# Patient Record
Sex: FEMALE | Race: WHITE | NOT HISPANIC OR LATINO
[De-identification: names, ages, dates, MRNs, and addresses within clinical notes are randomized per-mention and may not be internally consistent; named-entity substitution may affect disease eponyms.]

---

## 2017-06-19 ENCOUNTER — APPOINTMENT (OUTPATIENT)
Dept: CARDIOLOGY | Facility: CLINIC | Age: 82
End: 2017-06-19

## 2017-06-19 VITALS — BODY MASS INDEX: 17.42 KG/M2 | WEIGHT: 102 LBS | HEIGHT: 64 IN

## 2017-06-19 VITALS — HEART RATE: 72 BPM | DIASTOLIC BLOOD PRESSURE: 70 MMHG | RESPIRATION RATE: 18 BRPM | SYSTOLIC BLOOD PRESSURE: 118 MMHG

## 2017-09-12 ENCOUNTER — APPOINTMENT (OUTPATIENT)
Dept: CARDIOLOGY | Facility: CLINIC | Age: 82
End: 2017-09-12

## 2017-09-12 VITALS — RESPIRATION RATE: 18 BRPM | HEART RATE: 96 BPM | SYSTOLIC BLOOD PRESSURE: 118 MMHG | DIASTOLIC BLOOD PRESSURE: 80 MMHG

## 2017-09-12 VITALS — WEIGHT: 106 LBS | BODY MASS INDEX: 18.1 KG/M2 | HEIGHT: 64 IN

## 2018-03-14 ENCOUNTER — APPOINTMENT (OUTPATIENT)
Dept: CARDIOLOGY | Facility: CLINIC | Age: 83
End: 2018-03-14

## 2018-03-14 VITALS — DIASTOLIC BLOOD PRESSURE: 80 MMHG | RESPIRATION RATE: 18 BRPM | HEART RATE: 76 BPM | SYSTOLIC BLOOD PRESSURE: 120 MMHG

## 2018-03-14 VITALS — HEIGHT: 64 IN | BODY MASS INDEX: 17.93 KG/M2 | WEIGHT: 105 LBS

## 2018-10-09 ENCOUNTER — APPOINTMENT (OUTPATIENT)
Dept: CARDIOLOGY | Facility: CLINIC | Age: 83
End: 2018-10-09

## 2018-10-09 VITALS — SYSTOLIC BLOOD PRESSURE: 120 MMHG | DIASTOLIC BLOOD PRESSURE: 80 MMHG | RESPIRATION RATE: 18 BRPM | HEART RATE: 98 BPM

## 2018-10-09 VITALS — BODY MASS INDEX: 17.42 KG/M2 | WEIGHT: 102 LBS | HEIGHT: 64 IN

## 2018-10-09 DIAGNOSIS — I10 ESSENTIAL (PRIMARY) HYPERTENSION: ICD-10-CM

## 2018-10-09 DIAGNOSIS — I34.0 NONRHEUMATIC MITRAL (VALVE) INSUFFICIENCY: ICD-10-CM

## 2018-10-09 DIAGNOSIS — I48.91 UNSPECIFIED ATRIAL FIBRILLATION: ICD-10-CM

## 2019-01-29 ENCOUNTER — INPATIENT (INPATIENT)
Facility: HOSPITAL | Age: 84
LOS: 3 days | Discharge: NURSING, REHAB OR LONG-TERM CARE | End: 2019-02-02
Attending: SURGERY | Admitting: SURGERY
Payer: MEDICARE

## 2019-01-29 VITALS
HEART RATE: 60 BPM | SYSTOLIC BLOOD PRESSURE: 122 MMHG | TEMPERATURE: 95 F | DIASTOLIC BLOOD PRESSURE: 82 MMHG | RESPIRATION RATE: 18 BRPM | OXYGEN SATURATION: 96 %

## 2019-01-29 DIAGNOSIS — S43.006A UNSPECIFIED DISLOCATION OF UNSPECIFIED SHOULDER JOINT, INITIAL ENCOUNTER: Chronic | ICD-10-CM

## 2019-01-29 DIAGNOSIS — S72.009A FRACTURE OF UNSPECIFIED PART OF NECK OF UNSPECIFIED FEMUR, INITIAL ENCOUNTER FOR CLOSED FRACTURE: Chronic | ICD-10-CM

## 2019-01-29 LAB
ALBUMIN SERPL ELPH-MCNC: 4.2 G/DL — SIGNIFICANT CHANGE UP (ref 3.5–5.2)
ALP SERPL-CCNC: 62 U/L — SIGNIFICANT CHANGE UP (ref 30–115)
ALT FLD-CCNC: 17 U/L — SIGNIFICANT CHANGE UP (ref 0–41)
ANION GAP SERPL CALC-SCNC: 25 MMOL/L — HIGH (ref 7–14)
APPEARANCE UR: ABNORMAL
APTT BLD: 30.9 SEC — SIGNIFICANT CHANGE UP (ref 27–39.2)
AST SERPL-CCNC: 40 U/L — SIGNIFICANT CHANGE UP (ref 0–41)
BASE EXCESS BLDA CALC-SCNC: 0.3 MMOL/L — SIGNIFICANT CHANGE UP (ref -2–2)
BASOPHILS # BLD AUTO: 0.03 K/UL — SIGNIFICANT CHANGE UP (ref 0–0.2)
BASOPHILS NFR BLD AUTO: 0.1 % — SIGNIFICANT CHANGE UP (ref 0–1)
BILIRUB SERPL-MCNC: 0.9 MG/DL — SIGNIFICANT CHANGE UP (ref 0.2–1.2)
BILIRUB UR-MCNC: NEGATIVE — SIGNIFICANT CHANGE UP
BLD GP AB SCN SERPL QL: SIGNIFICANT CHANGE UP
BUN SERPL-MCNC: 45 MG/DL — HIGH (ref 10–20)
CALCIUM SERPL-MCNC: 10.2 MG/DL — HIGH (ref 8.5–10.1)
CHLORIDE SERPL-SCNC: 99 MMOL/L — SIGNIFICANT CHANGE UP (ref 98–110)
CK SERPL-CCNC: 102 U/L — SIGNIFICANT CHANGE UP (ref 0–225)
CO2 SERPL-SCNC: 18 MMOL/L — SIGNIFICANT CHANGE UP (ref 17–32)
COLOR SPEC: YELLOW — SIGNIFICANT CHANGE UP
CREAT SERPL-MCNC: 1.5 MG/DL — SIGNIFICANT CHANGE UP (ref 0.7–1.5)
DIFF PNL FLD: ABNORMAL
EOSINOPHIL # BLD AUTO: 0 K/UL — SIGNIFICANT CHANGE UP (ref 0–0.7)
EOSINOPHIL NFR BLD AUTO: 0 % — SIGNIFICANT CHANGE UP (ref 0–8)
GLUCOSE SERPL-MCNC: 202 MG/DL — HIGH (ref 70–99)
GLUCOSE UR QL: NEGATIVE — SIGNIFICANT CHANGE UP
HCO3 BLDA-SCNC: 24 MMOL/L — SIGNIFICANT CHANGE UP (ref 23–27)
HCT VFR BLD CALC: 34.5 % — LOW (ref 37–47)
HGB BLD-MCNC: 11.3 G/DL — LOW (ref 12–16)
IMM GRANULOCYTES NFR BLD AUTO: 1 % — HIGH (ref 0.1–0.3)
INR BLD: 1.19 RATIO — SIGNIFICANT CHANGE UP (ref 0.65–1.3)
KETONES UR-MCNC: NEGATIVE — SIGNIFICANT CHANGE UP
LACTATE SERPL-SCNC: 5.6 MMOL/L — CRITICAL HIGH (ref 0.5–2.2)
LACTATE SERPL-SCNC: 5.7 MMOL/L — CRITICAL HIGH (ref 0.5–2.2)
LEUKOCYTE ESTERASE UR-ACNC: ABNORMAL
LIDOCAIN IGE QN: 20 U/L — SIGNIFICANT CHANGE UP (ref 7–60)
LYMPHOCYTES # BLD AUTO: 0.58 K/UL — LOW (ref 1.2–3.4)
LYMPHOCYTES # BLD AUTO: 2.6 % — LOW (ref 20.5–51.1)
MCHC RBC-ENTMCNC: 32.8 G/DL — SIGNIFICANT CHANGE UP (ref 32–37)
MCHC RBC-ENTMCNC: 32.8 PG — HIGH (ref 27–31)
MCV RBC AUTO: 100 FL — HIGH (ref 81–99)
MONOCYTES # BLD AUTO: 1.45 K/UL — HIGH (ref 0.1–0.6)
MONOCYTES NFR BLD AUTO: 6.6 % — SIGNIFICANT CHANGE UP (ref 1.7–9.3)
NEUTROPHILS # BLD AUTO: 19.81 K/UL — HIGH (ref 1.4–6.5)
NEUTROPHILS NFR BLD AUTO: 89.7 % — HIGH (ref 42.2–75.2)
NITRITE UR-MCNC: NEGATIVE — SIGNIFICANT CHANGE UP
NRBC # BLD: 0 /100 WBCS — SIGNIFICANT CHANGE UP (ref 0–0)
PCO2 BLDA: 33 MMHG — LOW (ref 38–42)
PH BLDA: 7.47 — HIGH (ref 7.38–7.42)
PH UR: 6 — SIGNIFICANT CHANGE UP (ref 5–8)
PLATELET # BLD AUTO: 201 K/UL — SIGNIFICANT CHANGE UP (ref 130–400)
PO2 BLDA: 44 MMHG — LOW (ref 78–95)
POTASSIUM SERPL-MCNC: 5.8 MMOL/L — HIGH (ref 3.5–5)
POTASSIUM SERPL-SCNC: 5.8 MMOL/L — HIGH (ref 3.5–5)
PROT SERPL-MCNC: 7.2 G/DL — SIGNIFICANT CHANGE UP (ref 6–8)
PROT UR-MCNC: 30
PROTHROM AB SERPL-ACNC: 13.7 SEC — HIGH (ref 9.95–12.87)
RBC # BLD: 3.45 M/UL — LOW (ref 4.2–5.4)
RBC # FLD: 13.2 % — SIGNIFICANT CHANGE UP (ref 11.5–14.5)
SAO2 % BLDA: 82 % — LOW (ref 94–98)
SODIUM SERPL-SCNC: 142 MMOL/L — SIGNIFICANT CHANGE UP (ref 135–146)
SP GR SPEC: >=1.03 — SIGNIFICANT CHANGE UP (ref 1.01–1.03)
TROPONIN T SERPL-MCNC: <0.01 NG/ML — SIGNIFICANT CHANGE UP
TYPE + AB SCN PNL BLD: SIGNIFICANT CHANGE UP
UROBILINOGEN FLD QL: 0.2 — SIGNIFICANT CHANGE UP (ref 0.2–0.2)
WBC # BLD: 22.09 K/UL — HIGH (ref 4.8–10.8)
WBC # FLD AUTO: 22.09 K/UL — HIGH (ref 4.8–10.8)

## 2019-01-29 PROCEDURE — 99223 1ST HOSP IP/OBS HIGH 75: CPT

## 2019-01-29 RX ORDER — TAMSULOSIN HYDROCHLORIDE 0.4 MG/1
0.4 CAPSULE ORAL AT BEDTIME
Qty: 0 | Refills: 0 | Status: DISCONTINUED | OUTPATIENT
Start: 2019-01-29 | End: 2019-01-30

## 2019-01-29 RX ORDER — HEPARIN SODIUM 5000 [USP'U]/ML
5000 INJECTION INTRAVENOUS; SUBCUTANEOUS EVERY 8 HOURS
Qty: 0 | Refills: 0 | Status: DISCONTINUED | OUTPATIENT
Start: 2019-01-29 | End: 2019-02-02

## 2019-01-29 RX ORDER — TIMOLOL 0.5 %
1 DROPS OPHTHALMIC (EYE)
Qty: 0 | Refills: 0 | COMMUNITY

## 2019-01-29 RX ORDER — METOPROLOL TARTRATE 50 MG
50 TABLET ORAL DAILY
Qty: 0 | Refills: 0 | Status: DISCONTINUED | OUTPATIENT
Start: 2019-01-29 | End: 2019-01-30

## 2019-01-29 RX ORDER — CHLORHEXIDINE GLUCONATE 213 G/1000ML
1 SOLUTION TOPICAL
Qty: 0 | Refills: 0 | Status: DISCONTINUED | OUTPATIENT
Start: 2019-01-29 | End: 2019-02-02

## 2019-01-29 RX ORDER — METOPROLOL TARTRATE 50 MG
50 TABLET ORAL DAILY
Qty: 0 | Refills: 0 | Status: DISCONTINUED | OUTPATIENT
Start: 2019-01-29 | End: 2019-01-29

## 2019-01-29 RX ORDER — SODIUM CHLORIDE 9 MG/ML
2000 INJECTION, SOLUTION INTRAVENOUS ONCE
Qty: 0 | Refills: 0 | Status: COMPLETED | OUTPATIENT
Start: 2019-01-29 | End: 2019-01-29

## 2019-01-29 RX ORDER — METOPROLOL TARTRATE 50 MG
1 TABLET ORAL
Qty: 0 | Refills: 0 | COMMUNITY

## 2019-01-29 RX ORDER — ASPIRIN/CALCIUM CARB/MAGNESIUM 324 MG
81 TABLET ORAL DAILY
Qty: 0 | Refills: 0 | Status: DISCONTINUED | OUTPATIENT
Start: 2019-01-29 | End: 2019-01-29

## 2019-01-29 RX ORDER — TIMOLOL 0.5 %
1 DROPS OPHTHALMIC (EYE) DAILY
Qty: 0 | Refills: 0 | Status: DISCONTINUED | OUTPATIENT
Start: 2019-01-29 | End: 2019-02-02

## 2019-01-29 RX ORDER — PANTOPRAZOLE SODIUM 20 MG/1
40 TABLET, DELAYED RELEASE ORAL DAILY
Qty: 0 | Refills: 0 | Status: DISCONTINUED | OUTPATIENT
Start: 2019-01-29 | End: 2019-01-30

## 2019-01-29 RX ORDER — SODIUM CHLORIDE 9 MG/ML
1000 INJECTION, SOLUTION INTRAVENOUS
Qty: 0 | Refills: 0 | Status: DISCONTINUED | OUTPATIENT
Start: 2019-01-29 | End: 2019-01-29

## 2019-01-29 RX ORDER — SODIUM CHLORIDE 9 MG/ML
1000 INJECTION INTRAMUSCULAR; INTRAVENOUS; SUBCUTANEOUS
Qty: 0 | Refills: 0 | Status: DISCONTINUED | OUTPATIENT
Start: 2019-01-29 | End: 2019-01-30

## 2019-01-29 RX ADMIN — SODIUM CHLORIDE 1000 MILLILITER(S): 9 INJECTION, SOLUTION INTRAVENOUS at 16:15

## 2019-01-29 NOTE — ED PROVIDER NOTE - OBJECTIVE STATEMENT
89F with pmh of HTN, paroxysmal AFib not on AC, and left hip fracture and left shoulder fracture with pin placement 5 years ago presents with fall prior to arrival. PT is unsure of mechanism and unsure of LOC. Denies CP, SOB, n/v/d, palpitations, or dizziness.

## 2019-01-29 NOTE — CONSULT NOTE ADULT - SUBJECTIVE AND OBJECTIVE BOX
89 yo female s/p fall with multiple hip and pelvic fracture, found to have pelvic hematoma anterior to bladder, ER tried to put thornton but reported hematuria,  called for thornton placement.  Pt seen and examined at bedside with Dr Darling  In sterile technique a 14f urethral catheter was placed at bedside with Dr Manuel. 10 cc was placed in balloon.  CLear urine drained from catheter.  Pt tolerated procedure well.  Thornton strapped to right upper thigh.    PAST MEDICAL & SURGICAL HISTORY:  Tricuspid regurgitation  Mitral regurgitation  HTN (hypertension)  Paroxysmal atrial fibrillation  Dislocation of shoulder region: L shoulder pins/surgery  Hip fracture: L hip replacement    MEDICATIONS  (STANDING):  chlorhexidine 4% Liquid 1 Application(s) Topical <User Schedule>  heparin  Injectable 5000 Unit(s) SubCutaneous every 8 hours  metoprolol succinate ER 50 milliGRAM(s) Oral daily  pantoprazole  Injectable 40 milliGRAM(s) IV Push daily  sodium chloride 0.9%. 1000 milliLiter(s) (125 mL/Hr) IV Continuous <Continuous>  tamsulosin 0.4 milliGRAM(s) Oral at bedtime  timolol 0.5% Solution 1 Drop(s) Both EYES daily    MEDICATIONS  (PRN):    Allergies    No Known Allergies    Intolerances                          11.3   22.09 )-----------( 201      ( 29 Jan 2019 12:41 )             34.5     Urinalysis Basic - ( 29 Jan 2019 21:10 )    Color: Yellow / Appearance: Cloudy / SG: >=1.030 / pH: x  Gluc: x / Ketone: Negative  / Bili: Negative / Urobili: 0.2   Blood: x / Protein: 30 / Nitrite: Negative   Leuk Esterase: Moderate / RBC: 6-10 /HPF / WBC 6-10 /HPF   Sq Epi: x / Non Sq Epi: x / Bacteria: Moderate /HPF    01-29    142  |  99  |  45<H>  ----------------------------<  202<H>  5.8<H>   |  18  |  1.5    Ca    10.2<H>      29 Jan 2019 12:41    TPro  7.2  /  Alb  4.2  /  TBili  0.9  /  DBili  x   /  AST  40  /  ALT  17  /  AlkPhos  62  01-29

## 2019-01-29 NOTE — CONSULT NOTE ADULT - ATTENDING COMMENTS
I personally saw and examined the patient, reviewed the chart and available data. I discussed the situation with the patient and Nursing as well as trauma team resident on call.     Please note, the patient was seen and examined by me on January 29, 2000 19th when I made rounds with the physician assistant. The note was not reviewed, corrected and signed until tonight
Pt seen and examined.  Agree with resident exam and plan.  -WBAT LLE  -NWB in sling LUE  -pain control  -f/u as outpatient with orthopedics at [address] [facility] with Dr. Mcdaniels or Dr. Nguyễn [phone].
Please see the note documenting the consult on the Swartz catheter insertion written on January 30 but done on January 29

## 2019-01-29 NOTE — CONSULT NOTE ADULT - SUBJECTIVE AND OBJECTIVE BOX
HPI: Ms. Churchill is a 89 y/o F who presents with inability to ambulate for 1 day. States does not recall any fall recently but woke up and was unable to ambulate. States his mild pain in left shoulder but no pain in pelvis or legs.    PMH  Tricuspid regurgitation  Mitral regurgitation  HTN (hypertension)  Paroxysmal atrial fibrillation    PSH  S/P L proximal humerus ORIF, L hip CMN (states both were roughly 4 years ago at [facility])    Home Medications:  Metoprolol Succinate ER 50 mg oral tablet, extended release: 1 tab(s) orally once a day (29 Jan 2019 16:03)  timolol hemihydrate 0.5% ophthalmic solution: 1 dose(s) to each affected eye once a day (29 Jan 2019 16:03)    Allergies    No Known Allergies    Intolerances      SH: Lives alone. Ambulates with cane (in right hand)    Vital Signs Last 24 Hrs  T(C): 35.6 (29 Jan 2019 18:30), Max: 36.2 (29 Jan 2019 12:30)  T(F): 96 (29 Jan 2019 18:30), Max: 97.2 (29 Jan 2019 12:30)  HR: 120 (29 Jan 2019 19:00) (60 - 120)  BP: 139/102 (29 Jan 2019 19:00) (118/62 - 139/102)  BP(mean): 128 (29 Jan 2019 19:00) (88 - 128)  RR: 30 (29 Jan 2019 19:00) (18 - 30)  SpO2: 100% (29 Jan 2019 19:00) (96% - 100%)    PE:  NAD  Respirations non labored  Appropriate mood and affect    LUE  Ecchymosis about anterior chest wall  TTP along clavicle - gross motion with palpation  Skin otherwise intact  AIN/PIN/U motor intact  SILT R/U/M  2+ R pulse    LLE  Non ttp throughout extremity (including groin)  Skin intact  SILT T/DP/SP  EHL/FHL/TA/GS motor intact  2+ DP pulse    Imaging: CT demonstrates left superior and inferior pubic rami fractures with small amount of sacral impaction. Incompletely imaged left clavicle on CT/cxr demonstrates a minimally displaced midshaft clavicle fracture.     A/P  89 y/o F with L clavicle fracture and LC1 pelvic injury  -WBAT LLE  -NWB LUE in sling  -Please obtain Left shoulder/clavicle xrays (ordered)  -PT for ambulation  -Analgesia  -No acute orthopedic intervention

## 2019-01-29 NOTE — ED ADULT NURSE REASSESSMENT NOTE - NS ED NURSE REASSESS COMMENT FT1
Unable to insert thornton catheter due to anatomy. Multiple attempts made in ED, pt tolerated attempts, SICU SRIRAM Tompkins notified at #[id number] as well as ICU RN Dwight. Repeat lactate not drawn in ED due to LR bolus still infusing, PA and RN aware. Pt taken to ICU.

## 2019-01-29 NOTE — CONSULT NOTE ADULT - ASSESSMENT
ASSESSMENT/PLAN: 89yFemale      Neurologic:  Pain control: Tylenol     Respiratory:  - IS, RA     Cardiovascular:  - HDS   - Afib restrt bb     Gastrointestinal/Nutrition:  - NPO till cyst    Genitourinary/Renal:    Hematologic:    Infectious Disease:    Endocrine:    Disposition: ASSESSMENT/PLAN: 89yFemale      Neurologic:  Pain control: Tylenol     Respiratory:  - IS, RA     Cardiovascular:  - HDS   - Afib restrt bb     Gastrointestinal/Nutrition:  - NPO till cystogram     Genitourinary/Renal:    Hematologic:    Infectious Disease:  afeb     Endocrine:    Disposition: ASSESSMENT/PLAN: 89yFemale      Neurologic:  Pain control: Tylenol     Respiratory:  Manubrial fx:  - Pain control   - IS/PT    Cardiovascular:  Hx of Afib:  - Restart home metoprolol    - Monitor HD, Maintain MAP >65    Gastrointestinal/Nutrition:  - NPO, will start diet post cystogram     Genitourinary/Renal:  R/o bladder injury  - F/u cystogram  - Will place thornton  - Strict I/Os  - F/u labs and replete    Hematologic:  - H/H stable, monitor transfuse for Hg <7     Infectious Disease:  Leukocytosis:  Reactive vs sepsis?  - Monitor for s/s SIRS  - F/u UA    Endocrine:      Disposition: ASSESSMENT/PLAN: 89yFemale with pmhx as above  s/p fall from standing one day prior to arrival sustaining pubic rami fractures, clavicle fx and possible bladder rupture     Neurologic:  Pain control: Tylenol     Respiratory:  Manubrial fx:  - Pain control   - IS/PT    Cardiovascular:  Hx of Afib:  - Restart home metoprolol  - Hold home ASA  - Monitor HD, Maintain MAP >65  - F/u EKG    R/o syncope:  - F/u echo     Gastrointestinal/Nutrition:  - NPO except meds, will start diet after cystogram     Genitourinary/Renal:  R/o bladder injury  - F/u cystogram  - Will place thornton  - LR @125   - Strict I/Os  - F/u labs and replete    R/o rhabdo:  - F/u CPK     Hematologic:  - H/H stable, trend, transfuse for Hg <7     MSK:   Left inferior, superior pubic rami fx non displaced manubrial fx, left proximal clavicle fx, distal clavicle fx  - Ortho consulted, appreciate recs    Infectious Disease:  Leukocytosis:  Reactive vs sepsis?  - Monitor for s/s SIRS  - F/u repeat lactate (5.6 on admission)  - F/u UA  - Currently no indication for abx    Endocrine:  - No Hx of DM  - FS q6h for 24hr     Disposition: SICU ASSESSMENT/PLAN: 89yFemale with pmhx as above  s/p fall from standing one day prior to arrival sustaining pubic rami fractures, clavicle fx and possible bladder rupture     Neurologic:  Pain control: Tylenol     Respiratory:  Manubrial fx:  - Pain control   - IS/PT    Cardiovascular:  Hx of Afib:  - Restart home metoprolol  - Hold home ASA  - Monitor HD, Maintain MAP >65  - F/u EKG    R/o syncope:  - F/u echo     Gastrointestinal/Nutrition:  - NPO except meds, will start diet after cystogram     Genitourinary/Renal:  R/o bladder injury  - F/u cystogram  - Will place thornton  - LR @125   - Strict I/Os  - F/u labs and replete    MARIANO:  - Unknown baseline  - Likely BRENDA, fluid resuscitate    R/o rhabdo:  - F/u CPK     Hematologic:  - H/H stable, trend, transfuse for Hg <7     MSK:   Left inferior, superior pubic rami fx non displaced manubrial fx, left proximal clavicle fx, distal clavicle fx  - Ortho consulted, appreciate recs    Infectious Disease:  Leukocytosis:  Reactive vs sepsis?  - Monitor for s/s SIRS  - F/u repeat lactate (5.6 on admission)  - F/u UA  - Currently no indication for abx    Endocrine:  - No Hx of DM  - FS q6h for 24hr     Disposition: SICU ASSESSMENT/PLAN: 89yFemale with pmhx as above  s/p fall from standing one day prior to arrival sustaining pubic rami fractures, clavicle fx and possible bladder rupture     Neurologic:  Pain control: Tylenol     Respiratory:  Manubrial fx:  - Pain control   - IS/PT    Cardiovascular:  Hx of Afib:  - Restart home metoprolol  - Hold home ASA  - Monitor HD, Maintain MAP >65  - F/u EKG    R/o syncope:  - F/u echo   - F/u EEG    Gastrointestinal/Nutrition:  - NPO except meds, will start diet after cystogram     Genitourinary/Renal:  R/o bladder injury  - F/u cystogram  - Will place thornton  - LR @125   - Strict I/Os  - F/u labs and replete    MARIANO:  - Unknown baseline  - Likely BRENDA, fluid resuscitate    R/o rhabdo:  - F/u CPK     Hematologic:  - H/H stable, trend, transfuse for Hg <7     MSK:   Left inferior, superior pubic rami fx non displaced manubrial fx, left proximal clavicle fx, distal clavicle fx  - Ortho consulted, appreciate recs    Infectious Disease:  Leukocytosis:  Reactive vs sepsis?  - Monitor for s/s SIRS  - F/u repeat lactate (5.6 on admission)  - F/u UA  - Currently no indication for abx    Endocrine:  - No Hx of DM  - FS q6h for 24hr     Disposition: SICU ASSESSMENT/PLAN: 89yFemale with pmhx as above  s/p fall from standing one day prior to arrival sustaining pubic rami fractures, clavicle fx and possible bladder rupture     Neurologic:  Pain control: Tylenol     Respiratory:  Manubrium fx:  - Pain control   - IS/PT    Cardiovascular:  Hx of Afib:  - Restart home metoprolol  - Hold home ASA  - Monitor HD, Maintain MAP >65  - F/u EKG    R/o syncope:  - F/u echo     Gastrointestinal/Nutrition:  - Regular diet     Genitourinary/Renal:  R/o bladder injury  - F/u cystogram  - Will place thornton  - LR @125   - Strict I/Os  - F/u labs and replete    MARIANO:  - Unknown baseline  - Likely BRENDA, fluid resuscitate    R/o rhabdo:  - F/u CPK     Hematologic:  - H/H stable, trend, transfuse for Hg <7     MSK:   Left inferior, superior pubic rami fx non displaced manubrial fx, left proximal clavicle fx, distal clavicle fx  - Ortho consulted, appreciate recs    Infectious Disease:  Leukocytosis:  Reactive vs sepsis?  - Monitor for s/s SIRS  - F/u repeat lactate (5.6 on admission)  - F/u UA  - Currently no indication for abx    Endocrine:  - No Hx of DM  - FS q6h for 24hr     Disposition: SICU

## 2019-01-29 NOTE — H&P ADULT - NSHPLABSRESULTS_GEN_ALL_CORE
11.3   22.09 )-----------( 201      ( 29 Jan 2019 12:41 )             34.5     01-29    142  |  99  |  45<H>  ----------------------------<  202<H>  5.8<H>   |  18  |  1.5    Ca    10.2<H>      29 Jan 2019 12:41    TPro  7.2  /  Alb  4.2  /  TBili  0.9  /  DBili  x   /  AST  40  /  ALT  17  /  AlkPhos  62  01-29    CARDIAC MARKERS ( 29 Jan 2019 12:41 )  x     / <0.01 ng/mL / 102 U/L / x     / x    PT/INR - ( 29 Jan 2019 12:41 )   PT: 13.70 sec;   INR: 1.19 ratio         PTT - ( 29 Jan 2019 12:41 )  PTT:30.9 sec    < from: Xray Chest 1 View-PORTABLE IMMEDIATE (01.29.19 @ 13:14) >    Impression:      No focal opacity.      < end of copied text >    < from: Xray Pelvis AP only (01.29.19 @ 13:15) >                                                                        FINDINGS/  IMPRESSION:    There is subtle cortical irregularity of the left inferior pubic ramus   compatible with a minimally displaced fracture, much better appreciated   on the contemporaneous CT.    Stable fibroids.    Left femoral hardware. There is no radiographic evidence for   periprosthetic loosening/fracture.    The visualized bowel and soft tissue are unremarkable.     < end of copied text >    < from: CT Head No Cont (01.29.19 @ 13:21) >      IMPRESSION:     1.  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    2.  No acute fractures, mass effect, midline shift or intracranial   hemorrhage.     < end of copied text >    IMPRESSION:     1.  Diffuse osteoporosis and degenerative changes of the cervical spine   C2-3 through C6-7 as described above.    2.  Hyperlordotic curvature of the cervical spine with mild   anterolisthesis of C3 on C4, C4 on C5, C5 on C6 and  C7on T1.    3.  No acute fractures or dislocations.     4.  Status post left shoulder ORIF.     < end of copied text >    < from: CT Abdomen and Pelvis w/ IV Cont (01.29.19 @ 13:34) >    IMPRESSION:   1.  Acute nondisplaced left inferior pubic ramus and left high superior  pubic ramus/anterior acetabular wall and left pubic body fractures;   ill-defined pelvic soft tissue hematoma along the anterior margin of the   urinary bladder.    2.  Nondisplaced manubrial fracture, comminuted left proximal clavicular   fractureand partially imaged distal clavicular fracture.    3.  No evidence of solid organ or vascular injury in the chest, abdomen   or pelvis.     4.  Additional incidental findings as above.    < end of copied text > Labs:  CAPILLARY BLOOD GLUCOSE                          11.3   22.09 )-----------( 201      ( 29 Jan 2019 12:41 )             34.5       Auto Immature Granulocyte %: 1.0 % (01-29-19 @ 12:41)  Auto Neutrophil %: 89.7 % (01-29-19 @ 12:41)    01-29    142  |  99  |  45<H>  ----------------------------<  202<H>  5.8<H>   |  18  |  1.5      Calcium, Total Serum: 10.2 mg/dL (01-29-19 @ 12:41)      LFTs:             7.2  | 0.9  | 40       ------------------[62      ( 29 Jan 2019 12:41 )  4.2  | x    | 17          Lipase:20     Amylase:x         Lactate, Blood: 5.6 mmol/L (01-29-19 @ 12:41)      Coags:     13.70  ----< 1.19    ( 29 Jan 2019 12:41 )     30.9        CARDIAC MARKERS ( 29 Jan 2019 12:41 )  x     / <0.01 ng/mL / 102 U/L / x     / x          < from: Xray Chest 1 View-PORTABLE IMMEDIATE (01.29.19 @ 13:14) >    Impression:      No focal opacity.      < from: Xray Pelvis AP only (01.29.19 @ 13:15) >  There is subtle cortical irregularity of the left inferior pubic ramus   compatible with a minimally displaced fracture, much better appreciated   on the contemporaneous CT.    Stable fibroids.    Left femoral hardware. There is no radiographic evidence for   periprosthetic loosening/fracture.    The visualized bowel and soft tissue are unremarkable.     < from: CT Head No Cont (01.29.19 @ 13:21) >  IMPRESSION:     1.  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    2.  No acute fractures, mass effect, midline shift or intracranial   hemorrhage.   IMPRESSION:     1.  Diffuse osteoporosis and degenerative changes of the cervical spine   C2-3 through C6-7 as described above.    2.  Hyperlordotic curvature of the cervical spine with mild   anterolisthesis of C3 on C4, C4 on C5, C5 on C6 and  C7on T1.    3.  No acute fractures or dislocations.     4.  Status post left shoulder ORIF.   < from: CT Abdomen and Pelvis w/ IV Cont (01.29.19 @ 13:34) >    IMPRESSION:   1.  Acute nondisplaced left inferior pubic ramus and left high superior  pubic ramus/anterior acetabular wall and left pubic body fractures;   ill-defined pelvic soft tissue hematoma along the anterior margin of the   urinary bladder.    2.  Nondisplaced manubrial fracture, comminuted left proximal clavicular   fractureand partially imaged distal clavicular fracture.    3.  No evidence of solid organ or vascular injury in the chest, abdomen   or pelvis.

## 2019-01-29 NOTE — H&P ADULT - ASSESSMENT
89 year old female with PMH significant for Afib, HTN, tricuspid regurg, and mitral regurg presents to the ED s/p unwitnessed fall, ?HT, ?LOC, on aspirin, found to have left superior and inferior pubic rami fractures, manubrium fracture, left clavicular fracture, and hematoma along anterior margin of urinary bladder.    Plan:  -Swartz  -CT Cysto  -SICU consult  -repeat labs: VBG  -Ortho consult  -Syncope work up: Echo, EEG 89 year old female with PMH significant for Afib, HTN, tricuspid regurg, and mitral regurg presents to the ED s/p unwitnessed fall, ?HT, ?LOC, on aspirin, found to have left superior and inferior pubic rami fractures, manubrium fracture, left clavicular fracture, and hematoma along anterior margin of urinary bladder.    Plan:  -Thornton  -CT Cysto  -SICU consult  -repeat labs: VBG  -Ortho consult  -Syncope work up: Echo, EEG    Senior Trauma Resident Note  89yo f s/p fall -ht ?loc ?asa  L clavicular fracture sling, pain control, ortho  manubrial fx - trops neg, f/u ekg   l inf/sup pubic rami fx  with hematoma surrounding bladder, trend h&h, thornton, ua, ct cysto, f/u ortho  wcc 22, lac 5 f/u repeats, fluids   home meds   sicu monitering   Airway intact  Bilateral Breath Sounds  Palpable pulses in 4 ext  GCS 15, PERRL, HUTCHISON  VSS  No Subq emphysema, abdominal tenderness,  or pelvic instability   CXR and negative  FAST neg  Ct findings above  Will Dispo accordingly  Plan as above d/w Dr Ayesha Baker

## 2019-01-29 NOTE — CONSULT NOTE ADULT - ASSESSMENT
I agreed with the need for a Swartz catheter. Number one we needed to see if there was indeed gross hematuria and if so access for a cystogram. As well she is not a candidate for clean intermittent catheterization is just moving her leg causes her severe pain.    Working with the physician’s assistant Jenifer having her place her finger in the Shila to block the introitus both of us wearing sterile equipment and having prepped and draped patient introducing ourselves and expanding what we were going to do we were able to guide a 16 [place] Swartz into the bladder. We got clear urine return 10 mL of water was placed into the balloon and the cath was pulled back to the bladder neck. Was secured to her right leg with a stat lock we make sure that the back was below the level of her body and on the bed and we instructed the nurse to not more than 500 mL come out at a time. Once it stop straining the total volume should be recorded and should be left to straight drainage. I think the risk of bladder rupture here is small but if necessary a CT cystogram could now be done. I would leave the catheter until she is able to move about as right now putting on a bedpan would be extremely painful but I would start Flomax as she is going to be having some spasm because of pain and the Flomax might help relax the bladder neck. I do not see the need for acute urologic intervention if we are needed please call us back.

## 2019-01-29 NOTE — ED PROVIDER NOTE - PHYSICAL EXAMINATION
CONSTITUTIONAL: Well-developed; well-nourished; in no acute distress.   SKIN: warm, dry  HEAD: Normocephalic; atraumatic.  EYES: PERRL, EOMI, no conjunctival erythema  ENT: No nasal discharge; airway clear.  NECK: Supple; non tender.  CARD: S1, S2 normal; no murmurs, gallops, or rubs. Regular rate and rhythm.   RESP: No wheezes, rales or rhonchi.  ABD: soft ntnd  EXT: Normal ROM.  No clubbing, cyanosis or edema. FROM of left and right arms at shoulder, elbow and wrists bilaterally, no point tenderness, sensation and motor function intact bilaterally.  LYMPH: No acute cervical adenopathy.  NEURO: Alert, oriented, grossly unremarkable  PSYCH: Cooperative, appropriate.

## 2019-01-29 NOTE — ED ADULT NURSE NOTE - OBJECTIVE STATEMENT
Pt aox3, in no acute distress, fell from standing today, no LOC, no use of blood thinners, no head injury, pt has bruising and pain to L chest and L shoulder, family at bedside. Pt placed on cardiac monitor and pulse ox, IV line started, labs obtained. Pt aox3, in no acute distress, fell from standing today, no LOC, no use of blood thinners, no head injury, pt has bruising and pain to L chest and L shoulder, pt also has bruising to L hip, family at bedside. Pt denies pain at this time. Pt placed on cardiac monitor and pulse ox, IV line started, labs obtained.

## 2019-01-29 NOTE — H&P ADULT - PMH
HTN (hypertension)    Mitral regurgitation    Paroxysmal atrial fibrillation    Tricuspid regurgitation

## 2019-01-29 NOTE — H&P ADULT - HISTORY OF PRESENT ILLNESS
89 year old female with PMH significant for Afib, HTN, tricuspid regurg, and mitral regurg presents to the ED s/p unwitnessed 89 year old female with PMH significant for Afib, HTN, tricuspid regurg, and mitral regurg presents to the ED s/p unwitnessed fall.  Patient states that she does not remember falling, she states that she was ambulating around her home yesterday evening, this morning she woke up around 5am and went to the bathroom, went to get coffee, and then went back to bed.  However, patient's daughter states that she was not able to get out of bed this morning and the 89 year old female with PMH significant for Afib, HTN, tricuspid regurg, and mitral regurg presents to the ED s/p unwitnessed fall.  Patient states that she does not remember falling, she states that she was ambulating around her home yesterday evening, this morning she woke up around 5am and went to the bathroom, went to get coffee, and then went back to bed.  However, patient's daughter states that she was not able to get out of bed this morning and she then called EMS.  Patient's daughter states that the hematoma that was present across the left side of her chest was not present a few days ago.  Patient denies any falls in the past few days.  Patient complains of pain near her hips when trying to lift her legs, otherwise denies chest pain, shortness of breath, nausea, vomiting, fever, chills, abdominal pain.

## 2019-01-29 NOTE — ED PROVIDER NOTE - ATTENDING CONTRIBUTION TO CARE
89 y/o female with hx of Afib, not anticoagulated due to frequent falls, hx of left hip ORIF, left shoulder fx presents to ED with unwitnessed fall just prior to arrival.  Cannot give mechanism and unknown prodrome.  Not ambulatory after the fall.  No CP/SOB.  No HA.  PE:  agree with above.  A/P:  Fall, r/o fracture, ICH.  Possible trauma evaluation.

## 2019-01-29 NOTE — CONSULT NOTE ADULT - ASSESSMENT
89 yo female s/p fall with multiple hip and pelvic fracture, found to have pelvic hematoma anterior to bladder. s/p placement of thornton catheter at bedside with clear urine    - keep thornton catheter for now, thornton care  - UA/Urine cx  - pradip has low threshold for bladder injury (wall of bladder intact and urine crystal clear), cystogram at discretion of primary team [place] does not think its warranted now  - team to follow in AM

## 2019-01-29 NOTE — CONSULT NOTE ADULT - SUBJECTIVE AND OBJECTIVE BOX
KAMAR OCAMPO  [id number]  89y Female with pmhx/surgical hx as below presents s/p unwitnessed fall from standing on 1/28 (unclear if on home ASA). No head trauma, unclear LOC. On arrival to ED, Pt GCS 15,afebrile, HDS, extensive bruising noted on left chest and back of neck.    Significant labs on arrival consisted of Leukocytosis to 22, venous lactate 5.6 , Cr 1.5     Positive trauma findings:  - Left non displaced inferior pubic rami fx /Lefgt superior pubic rami fx   - ill defined pelvic soft tissue hematoma along anterior margin of bladder  - non displaced manubrial fx   - comminuted left proximal clavicle fx/distal clavicle fx      Indication for ICU admission:  Admit Date:  ICU Date:  OR Date:    No Known Allergies    PAST MEDICAL & SURGICAL HISTORY:  Tricuspid regurgitation  Mitral regurgitation  HTN (hypertension)  Paroxysmal atrial fibrillation  Dislocation of shoulder region: L shoulder pins/surgery  Hip fracture: L hip replacement    Home Medications:  Metoprolol Succinate ER 50 mg oral tablet, extended release: 1 tab(s) orally once a day (29 Jan 2019 16:03)  timolol hemihydrate 0.5% ophthalmic solution: 1 dose(s) to each affected eye once a day (29 Jan 2019 16:03)        24HRS EVENT:              DVT PTX:     GI PTX:    ***Tubes/Lines/Drains  ***  Peripheral IV  Central Venous Line     	Date   Arterial Line		                Date   [] PICC:         	[] Midline		[] Mediport             Urinary Catheter		Indication: Strict I&O    Date Placed:       REVIEW OF SYSTEMS    [ ] A ten-point review of systems was otherwise negative except as noted.  [ ] Due to altered mental status/intubation, subjective information were not able to be obtained from the patient. History was obtained, to the extent possible, from review of the chart and collateral sources of information.    Daily     Daily         CURRENT MEDS:  Neurologic Medications    Respiratory Medications    Cardiovascular Medications    Gastrointestinal Medications    Genitourinary Medications    Hematologic/Oncologic Medications    Antimicrobial/Immunologic Medications    Endocrine/Metabolic Medications    Topical/Other Medications      ICU Vital Signs Last 24 Hrs  T(C): 36.2 (29 Jan 2019 12:30), Max: 36.2 (29 Jan 2019 12:30)  T(F): 97.2 (29 Jan 2019 12:30), Max: 97.2 (29 Jan 2019 12:30)  HR: 101 (29 Jan 2019 13:30) (60 - 112)  BP: 118/76 (29 Jan 2019 13:30) (118/76 - 126/82)  BP(mean): --  ABP: --  ABP(mean): --  RR: 18 (29 Jan 2019 12:30) (18 - 18)  SpO2: 96% (29 Jan 2019 12:30) (96% - 96%)      Adult Advanced Hemodynamics Last 24 Hrs  CVP(mm Hg): --  CVP(cm H2O): --  CO: --  CI: --  PA: --  PA(mean): --  PCWP: --  SVR: --  SVRI: --  PVR: --  PVRI: --          I&O's Summary    I&O's Detail      PHYSICAL EXAM:    General/Neuro  RASS:             GCS:     = E   / V   / M      Deficits:                             alert & oriented x 3, no focal deficits  Pupils:    Lungs:      clear to auscultation, Normal expansion/effort.     Cardiovascular : S1, S2.  Regular rate and rhythm.  Peripheral edema   Cardiac Rhythm: Normal Sinus Rhythm    GI: Abdomen soft, Non-tender, Non-distended.    Gastrostomy / Jejunostomy tube in place.  Nasogastric tube in place.  Colostomy / Ileostomy.    Wound:    Extremities: Extremities warm, pink, well-perfused. Pulses:Rt     Lt    Derm: Good skin turgor, no skin breakdown.      :       Swartz catheter in place.      CXR:     LABS:  CAPILLARY BLOOD GLUCOSE                              11.3   22.09 )-----------( 201      ( 29 Jan 2019 12:41 )             34.5       01-29    142  |  99  |  45<H>  ----------------------------<  202<H>  5.8<H>   |  18  |  1.5    Ca    10.2<H>      29 Jan 2019 12:41    TPro  7.2  /  Alb  4.2  /  TBili  0.9  /  DBili  x   /  AST  40  /  ALT  17  /  AlkPhos  62  01-29      PT/INR - ( 29 Jan 2019 12:41 )   PT: 13.70 sec;   INR: 1.19 ratio         PTT - ( 29 Jan 2019 12:41 )  PTT:30.9 sec  CARDIAC MARKERS ( 29 Jan 2019 12:41 )  x     / <0.01 ng/mL / 102 U/L / x     / x KAMAR OCAMPO  [id number]  89y Female with pmhx/surgical hx as below presents s/p unwitnessed fall from standing on 1/28 (unclear if on home ASA) no a/c . No head trauma, unclear LOC. On arrival to ED, Pt GCS 15,afebrile, HDS, extensive bruising noted on left chest and back of neck.    Significant labs on arrival consisted of Leukocytosis to 22, venous lactate 5.6 , Cr 1.5     Positive trauma findings:  - Left non displaced inferior pubic rami fx /Lefgt superior pubic rami fx   - ill defined pelvic soft tissue hematoma along anterior margin of bladder  - non displaced manubrial fx   - comminuted left proximal clavicle fx/distal clavicle fx      Indication for ICU admission:  Admit Date:  ICU Date:  OR Date:    No Known Allergies    PAST MEDICAL & SURGICAL HISTORY:  Tricuspid regurgitation  Mitral regurgitation  HTN (hypertension)  Paroxysmal atrial fibrillation  Dislocation of shoulder region: L shoulder pins/surgery  Hip fracture: L hip replacement    Home Medications:  Metoprolol Succinate ER 50 mg oral tablet, extended release: 1 tab(s) orally once a day (29 Jan 2019 16:03)  timolol hemihydrate 0.5% ophthalmic solution: 1 dose(s) to each affected eye once a day (29 Jan 2019 16:03)        24HRS EVENT:              DVT PTX:     GI PTX:    ***Tubes/Lines/Drains  ***  Peripheral IV  Central Venous Line     	Date   Arterial Line		                Date   [] PICC:         	[] Midline		[] Mediport             Urinary Catheter		Indication: Strict I&O    Date Placed:       REVIEW OF SYSTEMS    [ ] A ten-point review of systems was otherwise negative except as noted.  [ ] Due to altered mental status/intubation, subjective information were not able to be obtained from the patient. History was obtained, to the extent possible, from review of the chart and collateral sources of information.    Daily     Daily         CURRENT MEDS:  Neurologic Medications    Respiratory Medications    Cardiovascular Medications    Gastrointestinal Medications    Genitourinary Medications    Hematologic/Oncologic Medications    Antimicrobial/Immunologic Medications    Endocrine/Metabolic Medications    Topical/Other Medications      ICU Vital Signs Last 24 Hrs  T(C): 36.2 (29 Jan 2019 12:30), Max: 36.2 (29 Jan 2019 12:30)  T(F): 97.2 (29 Jan 2019 12:30), Max: 97.2 (29 Jan 2019 12:30)  HR: 101 (29 Jan 2019 13:30) (60 - 112)  BP: 118/76 (29 Jan 2019 13:30) (118/76 - 126/82)  BP(mean): --  ABP: --  ABP(mean): --  RR: 18 (29 Jan 2019 12:30) (18 - 18)  SpO2: 96% (29 Jan 2019 12:30) (96% - 96%)      Adult Advanced Hemodynamics Last 24 Hrs  CVP(mm Hg): --  CVP(cm H2O): --  CO: --  CI: --  PA: --  PA(mean): --  PCWP: --  SVR: --  SVRI: --  PVR: --  PVRI: --          I&O's Summary    I&O's Detail      PHYSICAL EXAM:    General/Neuro  RASS:             GCS:     = E   / V   / M      Deficits:                             alert & oriented x 3, no focal deficits  Pupils:    Lungs:      clear to auscultation, Normal expansion/effort.     Cardiovascular : S1, S2.  Regular rate and rhythm.  Peripheral edema   Cardiac Rhythm: Normal Sinus Rhythm    GI: Abdomen soft, Non-tender, Non-distended.    Gastrostomy / Jejunostomy tube in place.  Nasogastric tube in place.  Colostomy / Ileostomy.    Wound:    Extremities: Extremities warm, pink, well-perfused. Pulses:Rt     Lt    Derm: Good skin turgor, no skin breakdown.      :       Swartz catheter in place.      CXR:     LABS:  CAPILLARY BLOOD GLUCOSE                              11.3   22.09 )-----------( 201      ( 29 Jan 2019 12:41 )             34.5       01-29    142  |  99  |  45<H>  ----------------------------<  202<H>  5.8<H>   |  18  |  1.5    Ca    10.2<H>      29 Jan 2019 12:41    TPro  7.2  /  Alb  4.2  /  TBili  0.9  /  DBili  x   /  AST  40  /  ALT  17  /  AlkPhos  62  01-29      PT/INR - ( 29 Jan 2019 12:41 )   PT: 13.70 sec;   INR: 1.19 ratio         PTT - ( 29 Jan 2019 12:41 )  PTT:30.9 sec  CARDIAC MARKERS ( 29 Jan 2019 12:41 )  x     / <0.01 ng/mL / 102 U/L / x     / x KAMAR OCAMPO  [id number]  89y Female with pmhx/surgical hx as below presents s/p unwitnessed fall from standing on 1/28 (unclear if on home ASA) no a/c . No head trauma, unclear LOC. On arrival to ED, Pt GCS 15,afebrile, HDS, extensive bruising noted on left chest and back of neck.    Significant labs on arrival consisted of Leukocytosis to 22, venous lactate 5.6 , Cr 1.5     Positive trauma findings:  - Left non displaced inferior pubic rami fx /Lefgt superior pubic rami fx   - ill defined pelvic soft tissue hematoma along anterior margin of bladder  - non displaced manubrial fx   - comminuted left proximal clavicle fx/distal clavicle fx      Indication for ICU admission:  Admit Date:  ICU Date:  OR Date:    No Known Allergies    PAST MEDICAL & SURGICAL HISTORY:  Tricuspid regurgitation  Mitral regurgitation  HTN (hypertension)  Paroxysmal atrial fibrillation  Dislocation of shoulder region: L shoulder pins/surgery  Hip fracture: L hip replacement    Home Medications:  Metoprolol Succinate ER 50 mg oral tablet, extended release: 1 tab(s) orally once a day (29 Jan 2019 16:03)  timolol hemihydrate 0.5% ophthalmic solution: 1 dose(s) to each affected eye once a day (29 Jan 2019 16:03)        24HRS EVENT:              DVT PTX:     GI PTX:    ***Tubes/Lines/Drains  ***  Peripheral IV  Central Venous Line     	Date   Arterial Line		                Date   [] PICC:         	[] Midline		[] Mediport             Urinary Catheter		Indication: Strict I&O    Date Placed:       REVIEW OF SYSTEMS    [ ] A ten-point review of systems was otherwise negative except as noted.  [ ] Due to altered mental status/intubation, subjective information were not able to be obtained from the patient. History was obtained, to the extent possible, from review of the chart and collateral sources of information.    Daily     Daily         CURRENT MEDS:  Neurologic Medications    Respiratory Medications    Cardiovascular Medications    Gastrointestinal Medications    Genitourinary Medications    Hematologic/Oncologic Medications    Antimicrobial/Immunologic Medications    Endocrine/Metabolic Medications    Topical/Other Medications      ICU Vital Signs Last 24 Hrs  T(C): 36.2 (29 Jan 2019 12:30), Max: 36.2 (29 Jan 2019 12:30)  T(F): 97.2 (29 Jan 2019 12:30), Max: 97.2 (29 Jan 2019 12:30)  HR: 101 (29 Jan 2019 13:30) (60 - 112)  BP: 118/76 (29 Jan 2019 13:30) (118/76 - 126/82)  BP(mean): --  ABP: --  ABP(mean): --  RR: 18 (29 Jan 2019 12:30) (18 - 18)  SpO2: 96% (29 Jan 2019 12:30) (96% - 96%)        I&O's Summary    I&O's Detail      PHYSICAL EXAM:    General/Neuro  RASS:             GCS:     = E   / V   / M      Deficits:                             alert & oriented x 3, no focal deficits  Pupils:    Lungs:      clear to auscultation, Normal expansion/effort.     Cardiovascular : S1, S2.  Regular rate and rhythm.  Peripheral edema   Cardiac Rhythm: Normal Sinus Rhythm    GI: Abdomen soft, Non-tender, Non-distended.    Gastrostomy / Jejunostomy tube in place.  Nasogastric tube in place.  Colostomy / Ileostomy.    Wound:    Extremities: Extremities warm, pink, well-perfused. Pulses:Rt     Lt    Derm: Good skin turgor, no skin breakdown.      :       Swartz catheter in place.      CXR:     LABS:  CAPILLARY BLOOD GLUCOSE                              11.3   22.09 )-----------( 201      ( 29 Jan 2019 12:41 )             34.5       01-29    142  |  99  |  45<H>  ----------------------------<  202<H>  5.8<H>   |  18  |  1.5    Ca    10.2<H>      29 Jan 2019 12:41    TPro  7.2  /  Alb  4.2  /  TBili  0.9  /  DBili  x   /  AST  40  /  ALT  17  /  AlkPhos  62  01-29      PT/INR - ( 29 Jan 2019 12:41 )   PT: 13.70 sec;   INR: 1.19 ratio         PTT - ( 29 Jan 2019 12:41 )  PTT:30.9 sec  CARDIAC MARKERS ( 29 Jan 2019 12:41 )  x     / <0.01 ng/mL / 102 U/L / x     / x KAMAR OCAMPO  [id number]  89y Female with pmhx/surgical hx as below presents s/p unwitnessed fall from standing on 1/28 (unclear if on home ASA) no a/c . No head trauma, unclear LOC. On arrival to ED, Pt GCS 15,afebrile, HDS, extensive bruising noted on left chest and back of neck.    Significant labs on arrival consisted of Leukocytosis to 22, venous lactate 5.6 , Cr 1.5     Positive trauma findings:  - Left non displaced inferior pubic rami fx /Lefgt superior pubic rami fx   - ill defined pelvic soft tissue hematoma along anterior margin of bladder  - non displaced manubrial fx   - comminuted left proximal clavicle fx/distal clavicle fx      Indication for ICU admission:  Admit Date: 1/29  ICU Date: 1/29      No Known Allergies    PAST MEDICAL & SURGICAL HISTORY:  Tricuspid regurgitation  Mitral regurgitation  HTN (hypertension)  Paroxysmal atrial fibrillation  Dislocation of shoulder region: L shoulder pins/surgery  Hip fracture: L hip replacement    Home Medications:  Metoprolol Succinate ER 50 mg oral tablet, extended release: 1 tab(s) orally once a day (29 Jan 2019 16:03)  timolol hemihydrate 0.5% ophthalmic solution: 1 dose(s) to each affected eye once a day (29 Jan 2019 16:03)        24HRS EVENT:              DVT PTX:     GI PTX:    ***Tubes/Lines/Drains  ***  Peripheral IV  Central Venous Line     	Date   Arterial Line		                Date   [] PICC:         	[] Midline		[] Mediport             Urinary Catheter		Indication: Strict I&O    Date Placed:       REVIEW OF SYSTEMS    [ ] A ten-point review of systems was otherwise negative except as noted.  [ ] Due to altered mental status/intubation, subjective information were not able to be obtained from the patient. History was obtained, to the extent possible, from review of the chart and collateral sources of information.    Daily     Daily         CURRENT MEDS:  Neurologic Medications    Respiratory Medications    Cardiovascular Medications    Gastrointestinal Medications    Genitourinary Medications    Hematologic/Oncologic Medications    Antimicrobial/Immunologic Medications    Endocrine/Metabolic Medications    Topical/Other Medications      ICU Vital Signs Last 24 Hrs  T(C): 36.2 (29 Jan 2019 12:30), Max: 36.2 (29 Jan 2019 12:30)  T(F): 97.2 (29 Jan 2019 12:30), Max: 97.2 (29 Jan 2019 12:30)  HR: 101 (29 Jan 2019 13:30) (60 - 112)  BP: 118/76 (29 Jan 2019 13:30) (118/76 - 126/82)  BP(mean): --  ABP: --  ABP(mean): --  RR: 18 (29 Jan 2019 12:30) (18 - 18)  SpO2: 96% (29 Jan 2019 12:30) (96% - 96%)        I&O's Summary    I&O's Detail      PHYSICAL EXAM:    General/Neuro  RASS:             GCS:     = E   / V   / M      Deficits:                             alert & oriented x 3, no focal deficits  Pupils:    Lungs:      clear to auscultation, Normal expansion/effort.     Cardiovascular : S1, S2.  Regular rate and rhythm.  Peripheral edema   Cardiac Rhythm: Normal Sinus Rhythm    GI: Abdomen soft, Non-tender, Non-distended.    Gastrostomy / Jejunostomy tube in place.  Nasogastric tube in place.  Colostomy / Ileostomy.    Wound:    Extremities: Extremities warm, pink, well-perfused. Pulses:Rt     Lt    Derm: Good skin turgor, no skin breakdown.      :       Swartz catheter in place.      CXR:     LABS:  CAPILLARY BLOOD GLUCOSE                              11.3   22.09 )-----------( 201      ( 29 Jan 2019 12:41 )             34.5       01-29    142  |  99  |  45<H>  ----------------------------<  202<H>  5.8<H>   |  18  |  1.5    Ca    10.2<H>      29 Jan 2019 12:41    TPro  7.2  /  Alb  4.2  /  TBili  0.9  /  DBili  x   /  AST  40  /  ALT  17  /  AlkPhos  62  01-29      PT/INR - ( 29 Jan 2019 12:41 )   PT: 13.70 sec;   INR: 1.19 ratio         PTT - ( 29 Jan 2019 12:41 )  PTT:30.9 sec  CARDIAC MARKERS ( 29 Jan 2019 12:41 )  x     / <0.01 ng/mL / 102 U/L / x     / x KAMAR OCAMPO  [id number]  89y Female with pmhx/surgical hx as below presents s/p unwitnessed fall from standing on 1/28 (unclear if on home ASA) no a/c . No head trauma, unclear LOC. On arrival to ED, Pt GCS 15,afebrile, HDS, extensive bruising noted on left chest and back of neck.    Significant labs on arrival consisted of Leukocytosis to 22, venous lactate 5.6 , Cr 1.5     Positive trauma findings:  - Left non displaced inferior pubic rami fx /Left superior pubic rami fx   - Pelvic soft tissue hematoma along anterior margin of bladder  -anterior acetabular wall and left pubic body fractures  - non displaced manubrium  fx   - comminuted left proximal clavicle fx/distal clavicle fx      Indication for ICU admission:  Admit Date: 1/29  ICU Date: 1/29      No Known Allergies    PAST MEDICAL & SURGICAL HISTORY:  Tricuspid regurgitation  Mitral regurgitation  HTN (hypertension)  Paroxysmal atrial fibrillation  Dislocation of shoulder region: L shoulder pins/surgery  Hip fracture: L hip replacement    Home Medications:  Metoprolol Succinate ER 50 mg oral tablet, extended release: 1 tab(s) orally once a day (29 Jan 2019 16:03)  timolol hemihydrate 0.5% ophthalmic solution: 1 dose(s) to each affected eye once a day (29 Jan 2019 16:03)        DVT PTX: Hep SQ    GI PTX: Protonix    ***Tubes/Lines/Drains  ***  Peripheral IV          REVIEW OF SYSTEMS    [ x] A ten-point review of systems was otherwise negative except as noted.  [ ] Due to altered mental status/intubation, subjective information were not able to be obtained from the patient. History was obtained, to the extent possible, from review of the chart and collateral sources of information.      ICU Vital Signs Last 24 Hrs  T(C): 36.2 (29 Jan 2019 12:30), Max: 36.2 (29 Jan 2019 12:30)  T(F): 97.2 (29 Jan 2019 12:30), Max: 97.2 (29 Jan 2019 12:30)  HR: 101 (29 Jan 2019 13:30) (60 - 112)  BP: 118/76 (29 Jan 2019 13:30) (118/76 - 126/82)    RR: 18 (29 Jan 2019 12:30) (18 - 18)  SpO2: 96% (29 Jan 2019 12:30) (96% - 96%)    PHYSICAL EXAM:    General/Neuro  GCS 15, PERRL    Lungs:  CTAB, large left sided ecchymosis on chest     Cardiovascular : S1, S2.  Regular rate and rhythm.    Cardiac Rhythm: Normal Sinus Rhythm    GI: Abdomen soft, Non-tender, Non-distended. +BS    Extremities: Extremities warm, pink, well-perfused    Derm: Good skin turgor, no skin breakdown.      Imaging:  CT Abd/Pelvis:  - nondisplaced left inferior pubic ramus and left high superior  pubic ramus/anterior acetabular wall and left pubic body fractures. ill-defined pelvic soft tissue hematoma along the anterior margin of the   urinary bladder.  Nondisplaced manubrial fracture, comminuted left proximal clavicular fracture and partially imaged distal clavicular fracture.      CXR:       LABS:  CAPILLARY BLOOD GLUCOSE                     11.3   22.09 )-----------( 201      ( 29 Jan 2019 12:41 )             34.5       01-29    142  |  99  |  45<H>  ----------------------------<  202<H>  5.8<H>   |  18  |  1.5    Ca    10.2<H>      29 Jan 2019 12:41    TPro  7.2  /  Alb  4.2  /  TBili  0.9  /  DBili  x   /  AST  40  /  ALT  17  /  AlkPhos  62  01-29      PT/INR - ( 29 Jan 2019 12:41 )   PT: 13.70 sec;   INR: 1.19 ratio         PTT - ( 29 Jan 2019 12:41 )  PTT:30.9 sec  CARDIAC MARKERS ( 29 Jan 2019 12:41 )  x     / <0.01 ng/mL / 102 U/L / x     / x KAMAR OCAMPO  [id number]  89y Female with pmhx/surgical hx as below presents s/p unwitnessed fall from standing on 1/28 (unclear if on home ASA) no a/c . No head trauma, unclear LOC. On arrival to ED, Pt GCS 15,afebrile, HDS, extensive bruising noted on left chest and back of neck.    Significant labs on arrival consisted of Leukocytosis to 22, venous lactate 5.6 , Cr 1.5     Positive trauma workup findings:  - Left non displaced inferior pubic rami fx /Left superior pubic rami fx   - Pelvic soft tissue hematoma along anterior margin of bladder  -anterior acetabular wall and left pubic body fractures  - non displaced manubrium  fx   - comminuted left proximal clavicle fx/distal clavicle fx      Indication for ICU admission:  Admit Date: 1/29  ICU Date: 1/29      No Known Allergies    PAST MEDICAL & SURGICAL HISTORY:  Tricuspid regurgitation  Mitral regurgitation  HTN (hypertension)  Paroxysmal atrial fibrillation  Dislocation of shoulder region: L shoulder pins/surgery  Hip fracture: L hip replacement    Home Medications:  Metoprolol Succinate ER 50 mg oral tablet, extended release: 1 tab(s) orally once a day (29 Jan 2019 16:03)  timolol hemihydrate 0.5% ophthalmic solution: 1 dose(s) to each affected eye once a day (29 Jan 2019 16:03)        DVT PTX: Hep SQ    GI PTX: Protonix    ***Tubes/Lines/Drains  ***  Peripheral IV          REVIEW OF SYSTEMS    [ x] A ten-point review of systems was otherwise negative except as noted.  [ ] Due to altered mental status/intubation, subjective information were not able to be obtained from the patient. History was obtained, to the extent possible, from review of the chart and collateral sources of information.      ICU Vital Signs Last 24 Hrs  T(C): 36.2 (29 Jan 2019 12:30), Max: 36.2 (29 Jan 2019 12:30)  T(F): 97.2 (29 Jan 2019 12:30), Max: 97.2 (29 Jan 2019 12:30)  HR: 101 (29 Jan 2019 13:30) (60 - 112)  BP: 118/76 (29 Jan 2019 13:30) (118/76 - 126/82)    RR: 18 (29 Jan 2019 12:30) (18 - 18)  SpO2: 96% (29 Jan 2019 12:30) (96% - 96%)    PHYSICAL EXAM:    General/Neuro  GCS 15, PERRL    Lungs:  CTAB, large left sided ecchymosis on chest     Cardiovascular : S1, S2.  Regular rate and rhythm.    Cardiac Rhythm: Normal Sinus Rhythm    GI: Abdomen soft, Non-tender, Non-distended. +BS    Extremities: Extremities warm, pink, well-perfused    Derm: Good skin turgor, no skin breakdown.      Imaging:  CT Abd/Pelvis:  - nondisplaced left inferior pubic ramus and left high superior  pubic ramus/anterior acetabular wall and left pubic body fractures. ill-defined pelvic soft tissue hematoma along the anterior margin of the   urinary bladder.  Nondisplaced manubrial fracture, comminuted left proximal clavicular fracture and partially imaged distal clavicular fracture.      CXR: f/u read      LABS:  CAPILLARY BLOOD GLUCOSE                     11.3   22.09 )-----------( 201      ( 29 Jan 2019 12:41 )             34.5       01-29    142  |  99  |  45<H>  ----------------------------<  202<H>  5.8<H>   |  18  |  1.5    Ca    10.2<H>      29 Jan 2019 12:41    TPro  7.2  /  Alb  4.2  /  TBili  0.9  /  DBili  x   /  AST  40  /  ALT  17  /  AlkPhos  62  01-29      PT/INR - ( 29 Jan 2019 12:41 )   PT: 13.70 sec;   INR: 1.19 ratio         PTT - ( 29 Jan 2019 12:41 )  PTT:30.9 sec  CARDIAC MARKERS ( 29 Jan 2019 12:41 )  x     / <0.01 ng/mL / 102 U/L / x     / x KAAMR OCAMPO  [id number]  89y Female with pmhx/surgical hx as below presents s/p unwitnessed fall from standing on 1/28 (unclear if on home ASA) no a/c . No head trauma, unclear LOC. On arrival to ED, Pt GCS 15,afebrile, HDS, extensive bruising noted on left chest and back of neck.    Significant labs on arrival consisted of Leukocytosis to 22, venous lactate 5.6 , Cr 1.5     Positive trauma workup findings:  - Left non displaced inferior pubic rami fx /Left superior pubic rami fx   - Pelvic soft tissue hematoma along anterior margin of bladder  -anterior acetabular wall and left pubic body fractures  - non displaced manubrium  fx   - comminuted left proximal clavicle fx/distal clavicle fx      Indication for ICU admission: Trauma s/p fall multiple fx   Admit Date: 1/29  ICU Date: 1/29      No Known Allergies    PAST MEDICAL & SURGICAL HISTORY:  Tricuspid regurgitation  Mitral regurgitation  HTN (hypertension)  Paroxysmal atrial fibrillation  Dislocation of shoulder region: L shoulder pins/surgery  Hip fracture: L hip replacement    Home Medications:  Metoprolol Succinate ER 50 mg oral tablet, extended release: 1 tab(s) orally once a day (29 Jan 2019 16:03)  timolol hemihydrate 0.5% ophthalmic solution: 1 dose(s) to each affected eye once a day (29 Jan 2019 16:03)        DVT PTX: Hep SQ    GI PTX: Protonix    ***Tubes/Lines/Drains  ***  Peripheral IV          REVIEW OF SYSTEMS    [ x] A ten-point review of systems was otherwise negative except as noted.  [ ] Due to altered mental status/intubation, subjective information were not able to be obtained from the patient. History was obtained, to the extent possible, from review of the chart and collateral sources of information.      ICU Vital Signs Last 24 Hrs  T(C): 36.2 (29 Jan 2019 12:30), Max: 36.2 (29 Jan 2019 12:30)  T(F): 97.2 (29 Jan 2019 12:30), Max: 97.2 (29 Jan 2019 12:30)  HR: 101 (29 Jan 2019 13:30) (60 - 112)  BP: 118/76 (29 Jan 2019 13:30) (118/76 - 126/82)    RR: 18 (29 Jan 2019 12:30) (18 - 18)  SpO2: 96% (29 Jan 2019 12:30) (96% - 96%)    PHYSICAL EXAM:    General/Neuro  GCS 15, PERRL    Lungs:  CTAB, large left sided ecchymosis on chest     Cardiovascular : S1, S2.  Regular rate and rhythm.    Cardiac Rhythm: Normal Sinus Rhythm    GI: Abdomen soft, Non-tender, Non-distended. +BS    Extremities: Extremities warm, pink, well-perfused    Derm: Good skin turgor, no skin breakdown.      Imaging:  CT Abd/Pelvis:  - nondisplaced left inferior pubic ramus and left high superior  pubic ramus/anterior acetabular wall and left pubic body fractures. ill-defined pelvic soft tissue hematoma along the anterior margin of the   urinary bladder.  Nondisplaced manubrial fracture, comminuted left proximal clavicular fracture and partially imaged distal clavicular fracture.      CXR: f/u read      LABS:  CAPILLARY BLOOD GLUCOSE                     11.3   22.09 )-----------( 201      ( 29 Jan 2019 12:41 )             34.5       01-29    142  |  99  |  45<H>  ----------------------------<  202<H>  5.8<H>   |  18  |  1.5    Ca    10.2<H>      29 Jan 2019 12:41    TPro  7.2  /  Alb  4.2  /  TBili  0.9  /  DBili  x   /  AST  40  /  ALT  17  /  AlkPhos  62  01-29      PT/INR - ( 29 Jan 2019 12:41 )   PT: 13.70 sec;   INR: 1.19 ratio         PTT - ( 29 Jan 2019 12:41 )  PTT:30.9 sec  CARDIAC MARKERS ( 29 Jan 2019 12:41 )  x     / <0.01 ng/mL / 102 U/L / x     / x

## 2019-01-29 NOTE — ED ADULT TRIAGE NOTE - CHIEF COMPLAINT QUOTE
fell at home this am no head trauma no anticoag, pt ha bruising to left upper chest and clavicular aREA

## 2019-01-30 LAB
ANION GAP SERPL CALC-SCNC: 16 MMOL/L — HIGH (ref 7–14)
APPEARANCE UR: CLEAR — SIGNIFICANT CHANGE UP
BASOPHILS # BLD AUTO: 0.05 K/UL — SIGNIFICANT CHANGE UP (ref 0–0.2)
BASOPHILS NFR BLD AUTO: 0.4 % — SIGNIFICANT CHANGE UP (ref 0–1)
BILIRUB UR-MCNC: NEGATIVE — SIGNIFICANT CHANGE UP
BUN SERPL-MCNC: 38 MG/DL — HIGH (ref 10–20)
CALCIUM SERPL-MCNC: 8.6 MG/DL — SIGNIFICANT CHANGE UP (ref 8.5–10.1)
CHLORIDE SERPL-SCNC: 105 MMOL/L — SIGNIFICANT CHANGE UP (ref 98–110)
CK SERPL-CCNC: 41 U/L — SIGNIFICANT CHANGE UP (ref 0–225)
CO2 SERPL-SCNC: 22 MMOL/L — SIGNIFICANT CHANGE UP (ref 17–32)
COLOR SPEC: YELLOW — SIGNIFICANT CHANGE UP
CREAT SERPL-MCNC: 1.3 MG/DL — SIGNIFICANT CHANGE UP (ref 0.7–1.5)
CULTURE RESULTS: NO GROWTH — SIGNIFICANT CHANGE UP
DIFF PNL FLD: ABNORMAL
EOSINOPHIL # BLD AUTO: 0.09 K/UL — SIGNIFICANT CHANGE UP (ref 0–0.7)
EOSINOPHIL NFR BLD AUTO: 0.6 % — SIGNIFICANT CHANGE UP (ref 0–8)
GAS PNL BLDA: SIGNIFICANT CHANGE UP
GLUCOSE BLDC GLUCOMTR-MCNC: 95 MG/DL — SIGNIFICANT CHANGE UP (ref 70–99)
GLUCOSE SERPL-MCNC: 103 MG/DL — HIGH (ref 70–99)
GLUCOSE UR QL: NEGATIVE — SIGNIFICANT CHANGE UP
HCT VFR BLD CALC: 27.2 % — LOW (ref 37–47)
HCT VFR BLD CALC: 28.1 % — LOW (ref 37–47)
HGB BLD-MCNC: 9.4 G/DL — LOW (ref 12–16)
HGB BLD-MCNC: 9.6 G/DL — LOW (ref 12–16)
IMM GRANULOCYTES NFR BLD AUTO: 0.6 % — HIGH (ref 0.1–0.3)
KETONES UR-MCNC: NEGATIVE — SIGNIFICANT CHANGE UP
LACTATE SERPL-SCNC: 1.8 MMOL/L — SIGNIFICANT CHANGE UP (ref 0.5–2.2)
LACTATE SERPL-SCNC: 2 MMOL/L — SIGNIFICANT CHANGE UP (ref 0.5–2.2)
LEUKOCYTE ESTERASE UR-ACNC: NEGATIVE — SIGNIFICANT CHANGE UP
LYMPHOCYTES # BLD AUTO: 1.67 K/UL — SIGNIFICANT CHANGE UP (ref 1.2–3.4)
LYMPHOCYTES # BLD AUTO: 11.7 % — LOW (ref 20.5–51.1)
MAGNESIUM SERPL-MCNC: 0.9 MG/DL — LOW (ref 1.8–2.4)
MAGNESIUM SERPL-MCNC: 1.8 MG/DL — SIGNIFICANT CHANGE UP (ref 1.8–2.4)
MCHC RBC-ENTMCNC: 33.1 PG — HIGH (ref 27–31)
MCHC RBC-ENTMCNC: 33.5 PG — HIGH (ref 27–31)
MCHC RBC-ENTMCNC: 34.2 G/DL — SIGNIFICANT CHANGE UP (ref 32–37)
MCHC RBC-ENTMCNC: 34.6 G/DL — SIGNIFICANT CHANGE UP (ref 32–37)
MCV RBC AUTO: 96.8 FL — SIGNIFICANT CHANGE UP (ref 81–99)
MCV RBC AUTO: 96.9 FL — SIGNIFICANT CHANGE UP (ref 81–99)
MONOCYTES # BLD AUTO: 1.4 K/UL — HIGH (ref 0.1–0.6)
MONOCYTES NFR BLD AUTO: 9.8 % — HIGH (ref 1.7–9.3)
NEUTROPHILS # BLD AUTO: 10.98 K/UL — HIGH (ref 1.4–6.5)
NEUTROPHILS NFR BLD AUTO: 76.9 % — HIGH (ref 42.2–75.2)
NITRITE UR-MCNC: NEGATIVE — SIGNIFICANT CHANGE UP
NRBC # BLD: 0 /100 WBCS — SIGNIFICANT CHANGE UP (ref 0–0)
NRBC # BLD: 0 /100 WBCS — SIGNIFICANT CHANGE UP (ref 0–0)
PH UR: 6 — SIGNIFICANT CHANGE UP (ref 5–8)
PHOSPHATE SERPL-MCNC: 1.5 MG/DL — LOW (ref 2.1–4.9)
PHOSPHATE SERPL-MCNC: 2.7 MG/DL — SIGNIFICANT CHANGE UP (ref 2.1–4.9)
PLATELET # BLD AUTO: 131 K/UL — SIGNIFICANT CHANGE UP (ref 130–400)
PLATELET # BLD AUTO: 139 K/UL — SIGNIFICANT CHANGE UP (ref 130–400)
POTASSIUM SERPL-MCNC: 4.5 MMOL/L — SIGNIFICANT CHANGE UP (ref 3.5–5)
POTASSIUM SERPL-SCNC: 4.5 MMOL/L — SIGNIFICANT CHANGE UP (ref 3.5–5)
PROT UR-MCNC: ABNORMAL
RBC # BLD: 2.81 M/UL — LOW (ref 4.2–5.4)
RBC # BLD: 2.9 M/UL — LOW (ref 4.2–5.4)
RBC # FLD: 13.4 % — SIGNIFICANT CHANGE UP (ref 11.5–14.5)
RBC # FLD: 13.6 % — SIGNIFICANT CHANGE UP (ref 11.5–14.5)
RBC CASTS # UR COMP ASSIST: ABNORMAL /HPF
SODIUM SERPL-SCNC: 143 MMOL/L — SIGNIFICANT CHANGE UP (ref 135–146)
SP GR SPEC: >=1.03 — SIGNIFICANT CHANGE UP (ref 1.01–1.03)
SPECIMEN SOURCE: SIGNIFICANT CHANGE UP
UROBILINOGEN FLD QL: 1 (ref 0.2–0.2)
WBC # BLD: 13.12 K/UL — HIGH (ref 4.8–10.8)
WBC # BLD: 14.27 K/UL — HIGH (ref 4.8–10.8)
WBC # FLD AUTO: 13.12 K/UL — HIGH (ref 4.8–10.8)
WBC # FLD AUTO: 14.27 K/UL — HIGH (ref 4.8–10.8)

## 2019-01-30 PROCEDURE — 99222 1ST HOSP IP/OBS MODERATE 55: CPT | Mod: 25

## 2019-01-30 PROCEDURE — 51703 INSERT BLADDER CATH COMPLEX: CPT

## 2019-01-30 PROCEDURE — 99233 SBSQ HOSP IP/OBS HIGH 50: CPT

## 2019-01-30 RX ORDER — PANTOPRAZOLE SODIUM 20 MG/1
40 TABLET, DELAYED RELEASE ORAL DAILY
Qty: 0 | Refills: 0 | Status: DISCONTINUED | OUTPATIENT
Start: 2019-01-30 | End: 2019-02-02

## 2019-01-30 RX ORDER — MAGNESIUM SULFATE 500 MG/ML
2 VIAL (ML) INJECTION ONCE
Qty: 0 | Refills: 0 | Status: COMPLETED | OUTPATIENT
Start: 2019-01-30 | End: 2019-01-30

## 2019-01-30 RX ORDER — METOPROLOL TARTRATE 50 MG
37.5 TABLET ORAL DAILY
Qty: 0 | Refills: 0 | Status: DISCONTINUED | OUTPATIENT
Start: 2019-01-31 | End: 2019-02-02

## 2019-01-30 RX ORDER — SODIUM CHLORIDE 9 MG/ML
1000 INJECTION INTRAMUSCULAR; INTRAVENOUS; SUBCUTANEOUS
Qty: 0 | Refills: 0 | Status: DISCONTINUED | OUTPATIENT
Start: 2019-01-30 | End: 2019-01-31

## 2019-01-30 RX ORDER — ASPIRIN/CALCIUM CARB/MAGNESIUM 324 MG
81 TABLET ORAL DAILY
Qty: 0 | Refills: 0 | Status: DISCONTINUED | OUTPATIENT
Start: 2019-01-30 | End: 2019-02-02

## 2019-01-30 RX ORDER — ACETAMINOPHEN 500 MG
650 TABLET ORAL EVERY 6 HOURS
Qty: 0 | Refills: 0 | Status: DISCONTINUED | OUTPATIENT
Start: 2019-01-30 | End: 2019-02-02

## 2019-01-30 RX ADMIN — PANTOPRAZOLE SODIUM 40 MILLIGRAM(S): 20 TABLET, DELAYED RELEASE ORAL at 11:27

## 2019-01-30 RX ADMIN — Medication 650 MILLIGRAM(S): at 11:27

## 2019-01-30 RX ADMIN — Medication 81 MILLIGRAM(S): at 11:28

## 2019-01-30 RX ADMIN — CHLORHEXIDINE GLUCONATE 1 APPLICATION(S): 213 SOLUTION TOPICAL at 05:39

## 2019-01-30 RX ADMIN — Medication 12.5 GRAM(S): at 11:52

## 2019-01-30 RX ADMIN — HEPARIN SODIUM 5000 UNIT(S): 5000 INJECTION INTRAVENOUS; SUBCUTANEOUS at 21:24

## 2019-01-30 RX ADMIN — SODIUM CHLORIDE 50 MILLILITER(S): 9 INJECTION INTRAMUSCULAR; INTRAVENOUS; SUBCUTANEOUS at 23:01

## 2019-01-30 RX ADMIN — HEPARIN SODIUM 5000 UNIT(S): 5000 INJECTION INTRAVENOUS; SUBCUTANEOUS at 05:39

## 2019-01-30 RX ADMIN — Medication 1 DROP(S): at 11:28

## 2019-01-30 RX ADMIN — HEPARIN SODIUM 5000 UNIT(S): 5000 INJECTION INTRAVENOUS; SUBCUTANEOUS at 22:35

## 2019-01-30 RX ADMIN — TAMSULOSIN HYDROCHLORIDE 0.4 MILLIGRAM(S): 0.4 CAPSULE ORAL at 05:35

## 2019-01-30 RX ADMIN — Medication 50 MILLIGRAM(S): at 05:39

## 2019-01-30 RX ADMIN — HEPARIN SODIUM 5000 UNIT(S): 5000 INJECTION INTRAVENOUS; SUBCUTANEOUS at 13:53

## 2019-01-30 NOTE — PHYSICAL THERAPY INITIAL EVALUATION ADULT - LEVEL OF INDEPENDENCE: GAIT, REHAB EVAL
pt attempted one step but unable to continue 2* pt has difficulty weight shifting/maximum assist (25% patients effort)

## 2019-01-30 NOTE — PROGRESS NOTE ADULT - ASSESSMENT
ASSESSMENT/PLAN: 89yFemale with pmhx as above  s/p fall from standing one day prior to arrival sustaining pubic rami fractures, clavicle fx and possible bladder rupture     Neurologic: alert and oriented, denies any pain  Pain control: Tylenol     Respiratory:  Manubrium fx:  - Pain control   - IS/PT    Cardiovascular:  Hx of Afib:  - Restart home metoprolol  - Hold home ASA  - Monitor HD, Maintain MAP >65  - F/u EKG - afib with rvr and incomplete rbb, possible cardiology consult in am and echo pending    Gastrointestinal/Nutrition:  - Regular diet     Genitourinary/Renal:  R/o bladder injury  - F/u cystogram - pending completion, not needed from urology pov  - Swartz placed by urology, no gross hematuria, CT scan reviewed bedsided with urology who saw no gross signs of bladder injury (also not visualized on initial ct ap)  - LR @125, lactate trending downwards  - Strict I/Os  - F/u labs and replete prn    MARIANO:  - Unknown baseline  - Likely BRENDA, fluid resuscitate    R/o rhabdo:  - F/u CPK     Hematologic:  - H/H stable, trend, transfuse for Hg <7     MSK:   Left inferior, superior pubic rami fx non displaced manubrial fx, left proximal clavicle fx, distal clavicle fx  - Ortho consulted, appreciate recs    Infectious Disease:  Leukocytosis:  Reactive vs sepsis?  - Monitor for s/s SIRS  - Currently no indication for abx    Endocrine:  - No Hx of DM  - FS q6h for 24hr     Disposition: SICU ASSESSMENT/PLAN: 89y F with pmhx as above s/p fall from standing one day prior to arrival sustaining pubic rami fractures, clavicle fx and possible bladder rupture     Neurologic: alert and oriented, denies any pain  Pain control: Tylenol     Respiratory:  Manubrium fx:  - Pain control   - IS/PT    Cardiovascular:  Hx of Afib:  - Restart home metoprolol  - Hold home ASA  - Monitor HD, Maintain MAP >65  - F/u EKG - afib with rvr and incomplete rbb, possible cardiology consult in am and echo pending    Gastrointestinal/Nutrition:  - Regular diet     Genitourinary/Renal:  R/o bladder injury  - F/u cystogram - pending completion, not needed from urology pov  - Swartz placed by urology, no gross hematuria, CT scan reviewed bedsided with urology who saw no gross signs of bladder injury (also not visualized on initial ct ap)  - LR @125, lactate trending downwards  - Strict I/Os  - F/u labs and replete prn    MARIANO:  - Unknown baseline  - Likely BRENDA, fluid resuscitate    R/o rhabdo:  - F/u CPK     Hematologic:  - H/H stable, trend, transfuse for Hg <7     MSK:   Left inferior, superior pubic rami fx non displaced manubrial fx, left proximal clavicle fx, distal clavicle fx  - Ortho consulted, appreciate recs    Infectious Disease:  Leukocytosis:  Reactive vs sepsis?  - Monitor for s/s SIRS  - Currently no indication for abx    Endocrine:  - No Hx of DM  - FS q6h for 24hr     Disposition: SICU ASSESSMENT/PLAN: 89yFemale with pmhx as above  s/p fall from standing one day prior to arrival sustaining pubic rami fractures, clavicle fx and  bladder hematoma     Neurologic: alert and oriented, denies any pain. 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 Continues to deny fall, denies any loss of consciousness   Pain control:  start tylenol ATC for shoulder pain on motion . On home timolol eye drops.     Respiratory:- Chest CT: "Scattered areas of tubular, branching opacities in the right upper lobe and to a lesser degree in the left upper lobe most likely represents areas of mucoid impaction. No lobar consolidations, pleural   effusions or pneumothorax. No suspicious pulmonary masses. Bibasilar dependent atelectasis. Patent central airways;" encourage IS. On room air. Early ambulation with PT when able, uses cane and walker at home.   Manubrium fx: - Pain control as needed- denies any pain, not on any meds.  IS/PT  ABG- 7.49/ 31/58, bicarb 24, 93%     Cardiovascular:  Hx of Afib, Mitral and tricuspid regurg: on home metoprolol for rate control, not on anticoagulation. Significant ecchymosis on left chest and shoulder. Holding home ASA  - Monitor HD, Maintain MAP >65.   -  EKG - afib with rvr and incomplete rbb, echo pending    Gastrointestinal/Nutrition:  - Regular diet, on PPI     Genitourinary/Renal:  - concern for bladder injury- meatal bleeding after attempted thornton placement in ED. Urology placed thornton, no noted gross hematuria and images reviewed with radiology. Per urology, no need for CT cysto at this time, no sign of bladder injury. Urinalysis repeated with no sign of UTI, Urine culture collected and pending. On flomax.  UOP ~50ml/hr.  - Decrease IVF to 50 as taking in oral intake, Cr 1.5-> 1.3, no previous Cr available for MARIANO vs CKD, likely MARIANO. Continue to trend and hydrate gently.    - Lactate maximum 5.7-> 0.9, normalized. Ck 41, no rhabdomyo.   - Strict I/Os  - F/u labs and replete prn. hypomagnesemia, replete.       Hematologic:  -Hemoglobin 11.3-> 9.6-> 9.1, most likely reflecting original hemoconcentration as pan-cell line decrease. No tachycardia. Continue to monitor. Plt 131, INR 1.19. Restart aspirin and start on SQH, monitor ecchymosis.     MSK:   Left inferior, superior pubic rami fx, non displaced manubrial fx, left proximal clavicle fx, distal clavicle fx: pain control with ATC Tylenol and reassess. Motor and sensory intact   - Ortho consulted: follow up recommendations. Follow up reads of left clavicle and shoulder Xray (completed). Has left upper extremity sling. WBAT LLE, and NWB in LUE with sling. PT for ambulation, pain control, nonoperative management.  - has hardware noted on imaging  - follow up social work, case management, PT and physiatry consult    Infectious Disease:  Leukocytosis 13.2 (14.3, 22.1), likely reactive and hemoconcentration on presentation. Currently no indication for abx. Tmax 97.4. Urinalysis 1/30 negative, CT chest with bibasilar atelectasis. Continue to monitor, use IS    Endocrine:  - No Hx of DM, + appetite, tolerated small diet yesterday. FSG 95, no need for fingersticks to continue.       Disposition: consider downgrade with social work, PT and physiatry consult. case management- lives alone, uses cane and walker, with polytrauma

## 2019-01-30 NOTE — CHART NOTE - NSCHARTNOTEFT_GEN_A_CORE
Ms. Churchill is a 89yo F who presented after an unwitnessed likely fall, does not recall event, with bruising and GCS15 noted to have left nondisplaced inferior pubic rami fx/ left superior pubic rami fracture/ anterior acetabular wall and left pubic body fracture, nondisplaced manubrium fracture, comminuted left clavicle  and distal clavicle fracture, and  pelvic soft tissue hematoma along the anterior bladder wall.  She had traumatic attempted thornton placement                   ASSESSMENT/PLAN: 89yFemale with pmhx as above  s/p fall from standing one day prior to arrival sustaining pubic rami fractures, clavicle fx and  bladder hematoma     Neurologic: alert and oriented, denies any pain. 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 Continues to deny fall, denies any loss of consciousness   Pain control:  start tylenol ATC for shoulder pain on motion . On home timolol eye drops.     Respiratory:- Chest CT: "Scattered areas of tubular, branching opacities in the right upper lobe and to a lesser degree in the left upper lobe most likely represents areas of mucoid impaction. No lobar consolidations, pleural   effusions or pneumothorax. No suspicious pulmonary masses. Bibasilar dependent atelectasis. Patent central airways;" encourage IS. On room air. Early ambulation with PT when able, uses cane and walker at home.   Manubrium fx: - Pain control as needed- denies any pain, not on any meds.  IS/PT  ABG- 7.49/ 31/58, bicarb 24, 93%     Cardiovascular:  Hx of Afib, Mitral and tricuspid regurg: on home metoprolol for rate control, not on anticoagulation. Significant ecchymosis on left chest and shoulder. Holding home ASA  - Monitor HD, Maintain MAP >65.   -  EKG - afib with rvr and incomplete rbb, echo pending    Gastrointestinal/Nutrition:  - Regular diet, on PPI     Genitourinary/Renal:  - concern for bladder injury- meatal bleeding after attempted thornton placement in ED. Urology placed thornton, no noted gross hematuria and images reviewed with radiology. Per urology, no need for CT cysto at this time, no sign of bladder injury. Urinalysis repeated with no sign of UTI, Urine culture collected and pending. On flomax.  UOP ~50ml/hr.  - Decrease IVF to 50 as taking in oral intake, Cr 1.5-> 1.3, no previous Cr available for MARIANO vs CKD, likely MARIANO. Continue to trend and hydrate gently.    - Lactate maximum 5.7-> 0.9, normalized. Ck 41, no rhabdomyo.   - Strict I/Os  - F/u labs and replete prn. hypomagnesemia, replete.       Hematologic:  -Hemoglobin 11.3-> 9.6-> 9.1, most likely reflecting original hemoconcentration as pan-cell line decrease. No tachycardia. Continue to monitor. Plt 131, INR 1.19. Restart aspirin and start on SQH, monitor ecchymosis.     MSK:   Left inferior, superior pubic rami fx, non displaced manubrial fx, left proximal clavicle fx, distal clavicle fx: pain control with ATC Tylenol and reassess. Motor and sensory intact   - Ortho consulted: follow up recommendations. Follow up reads of left clavicle and shoulder Xray (completed). Has left upper extremity sling. WBAT LLE, and NWB in LUE with sling. PT for ambulation, pain control, nonoperative management.  - has hardware noted on imaging  - follow up social work, case management, PT and physiatry consult    Infectious Disease:  Leukocytosis 13.2 (14.3, 22.1), likely reactive and hemoconcentration on presentation. Currently no indication for abx. Tmax 97.4. Urinalysis 1/30 negative, CT chest with bibasilar atelectasis. Continue to monitor, use IS    Endocrine:  - No Hx of DM, + appetite, tolerated small diet yesterday. FSG 95, no need for fingersticks to continue.       Disposition: consider downgrade with social work, PT and physiatry consult. case management- lives alone, uses cane and walker, with polytrauma Ms. Churchill is a 89yo F who presented after an unwitnessed likely fall, does not recall event, with bruising and GCS15 noted to have left nondisplaced inferior pubic rami fx/ left superior pubic rami fracture/ anterior acetabular wall and left pubic body fracture, nondisplaced manubrium fracture, comminuted left clavicle  and distal clavicle fracture, and  pelvic soft tissue hematoma along the anterior bladder wall.  She had traumatic attempted thornton placement with bleeding at the meatus, urology was consulted and placed a thornton without gross hematuria. She has a UCx pending and was started on Flomax as per urology recommendations, with no need for CT Cysto per urology.  She has been tolerating a diet, is hemodynamically stable, with Hgb drop likely from hemoconcentration on presentation.  Lactate was initially elevated but has since cleared. She is tolerating a diet and is on continued gentle hydration for MARIANO.  Follow up final ortho recs, follow up plain films and continue LUE NWB with sling and WBAT on LLE with PT, physiatry consulted. CM and social work also consulted as lives alone, uses cane and walker at home.          PLAN:   Neurologic: alert and oriented. 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 Continues to deny fall, denies LOC. Pain control Tylenol ATC. On home timolol eye drops.     Respiratory: Atelectasis, encourage IS. On room air. Early ambulation with PT when able, uses cane and walker at home. Manubrium fx: - Pain control as needed, IS/PT    Cardiovascular:  Hx of Afib, Mitral and tricuspid regurg: on home metoprolol for rate control, not on AC.  Significant ecchymosis on left chest and shoulder. Restarted home aspirin, follow up echo.   -  EKG - afib with rvr and incomplete rbb, echo pending- follow up.     Gastrointestinal/Nutrition:- Regular diet, on PPI     Genitourinary/Renal:  - pelvic hematoma along anterior bladder wall-  meatal bleeding after attempted thornton by ED, urology placed and recommended Flomax, no need for CT cysto, UCx pending. f/u urine culture, urology recs, will have thornton on floor.   -  IVF to 50  Cr 1.5-> 1.3, no previous Cr available for MARIANO vs CKD, likely MARIANO. Continue to trend and hydrate gently.    - Lactate maximum 5.7-> 0.9, normalized. CK 41, no rhabdomyo.     Hematologic:  -Hemoglobin 11.3-> 9.6-> 9.1, most likely reflecting original hemoconcentration as pan-cell line decrease. No tachycardia. Continue to monitor. Plt 131, INR 1.19. Restart aspirin and start on SQH, monitor ecchymosis.     MSK:   Left inferior, superior pubic rami fx, acetabular and pubic body fx, non displaced manubrial fx, left proximal clavicle fx, distal clavicle fx: pain control with ATC Tylenol,   - Ortho consulted: follow up recommendations. Follow up reads of left clavicle and shoulder Xray (completed). Has left upper extremity sling. WBAT LLE, and NWB in LUE with sling. PT for ambulation, pain control, nonoperative management.  - has hardware noted on imaging  - follow up social work, case management, PT and physiatry consult    Infectious Disease:  Leukocytosis 13.2 (14.3, 22.1), likely reactive, no ABx, afebrile.  Tmax 97.4. UA  1/30 negative, CT chest with bibasilar atelectasis. Continue to monitor, use IS    Endocrine- no issues  Disposition: consider downgrade with social work, PT and physiatry consult. case management- lives alone, uses cane and walker, with polytrauma    Signed out to ______________________ Ms. Churchill is a 89yo F who presented after an unwitnessed likely fall, does not recall event, with bruising and GCS15 noted to have left nondisplaced inferior pubic rami fx/ left superior pubic rami fracture/ anterior acetabular wall and left pubic body fracture, nondisplaced manubrium fracture, comminuted left clavicle  and distal clavicle fracture, and  pelvic soft tissue hematoma along the anterior bladder wall.  She had traumatic attempted thornton placement with bleeding at the meatus, urology was consulted and placed a thornton without gross hematuria. She has a UCx pending and was started on Flomax as per urology recommendations, with no need for CT Cysto per urology.  She has been tolerating a diet, is hemodynamically stable, with Hgb drop likely from hemoconcentration on presentation.  Lactate was initially elevated but has since cleared. She is tolerating a diet and is on continued gentle hydration for MARIANO.  Follow up final ortho recs, follow up plain films and continue LUE NWB with sling and WBAT on LLE with PT, physiatry consulted. CM and social work also consulted as lives alone, uses cane and walker at home.          PLAN:   Neurologic: alert and oriented. 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 Continues to deny fall, denies LOC. Pain control Tylenol ATC. On home timolol eye drops.     Respiratory: Atelectasis, encourage IS. On room air. Early ambulation with PT when able, uses cane and walker at home. Manubrium fx: - Pain control as needed, IS/PT    Cardiovascular:  Hx of Afib, Mitral and tricuspid regurg: on home metoprolol for rate control, not on AC.  Significant ecchymosis on left chest and shoulder. Restarted home aspirin, follow up echo.   -  EKG - afib with rvr and incomplete rbb, echo pending- follow up.     Gastrointestinal/Nutrition:- Regular diet, on PPI     Genitourinary/Renal:  - pelvic hematoma along anterior bladder wall-  meatal bleeding after attempted thornton by ED, urology placed and recommended Flomax, no need for CT cysto, UCx pending. f/u urine culture, urology recs, will have thornton on floor.   -  IVF to 50  Cr 1.5-> 1.3, no previous Cr available for MARIANO vs CKD, likely MARIANO. Continue to trend and hydrate gently.    - Lactate maximum 5.7-> 0.9, normalized. CK 41, no rhabdomyo.     Hematologic:  -Hemoglobin 11.3-> 9.6-> 9.1, most likely reflecting original hemoconcentration as pan-cell line decrease. No tachycardia. Continue to monitor. Plt 131, INR 1.19. Restart aspirin and start on SQH, monitor ecchymosis.     MSK:   Left inferior, superior pubic rami fx, acetabular and pubic body fx, non displaced manubrial fx, left proximal clavicle fx, distal clavicle fx: pain control with ATC Tylenol,   - Ortho consulted: follow up recommendations. Follow up reads of left clavicle and shoulder Xray (completed). Has left upper extremity sling. WBAT LLE, and NWB in LUE with sling. PT for ambulation, pain control, nonoperative management.  - has hardware noted on imaging  - follow up social work, case management, PT and physiatry consult    Infectious Disease:  Leukocytosis 13.2 (14.3, 22.1), likely reactive, no ABx, afebrile.  Tmax 97.4. UA  1/30 negative, CT chest with bibasilar atelectasis. Continue to monitor, use IS    Endocrine- no issues  Disposition: consider downgrade with social work, PT and physiatry consult. case management- lives alone, uses cane and walker, with polytrauma    Signed out to Dr. Cooper on trauma team.   - updated: SBPs 90s, dc flomax and decrease metoprolol to 37.5mg QD, discussed with fellow and trauma team. MAPs 60s-80s, mentating appropriately, maintaining.

## 2019-01-30 NOTE — PROGRESS NOTE ADULT - ASSESSMENT
89 y.o female s/p fall with multiple pelvic fractures, bladder hematoma - with thornton, no hematuria - doing well.    ·	maintain thornton catheter for now  ·	monitor for signs of hematuria  ·	no  need for cystogram at this time  ·	no acute  intervention  ·	w/d with attng 89 y.o female s/p fall with multiple pelvic fractures, bladder hematoma - with thornton, no hematuria - doing well.    ·	maintain thornton catheter for now  ·	monitor for signs of hematuria  ·	no  need for cystogram at this time  ·	no acute  intervention  ·	w/d with attng    Issues reviewed with the physician's assistant, the note was reviewed and I agree with what is written

## 2019-01-30 NOTE — PROGRESS NOTE ADULT - SUBJECTIVE AND OBJECTIVE BOX
KAMAR OCAMPO  [id number]  89y Female    Indication for ICU admission:  Admit Date:01-29-19  ICU Date:1/29    No Known Allergies    PAST MEDICAL & SURGICAL HISTORY:  Tricuspid regurgitation  Mitral regurgitation  HTN (hypertension)  Paroxysmal atrial fibrillation  Dislocation of shoulder region: L shoulder pins/surgery  Hip fracture: L hip replacement    Home Medications:  Metoprolol Succinate ER 50 mg oral tablet, extended release: 1 tab(s) orally once a day (29 Jan 2019 16:03)  timolol hemihydrate 0.5% ophthalmic solution: 1 dose(s) to each affected eye once a day (29 Jan 2019 16:03)        24HRS EVENT:    Neuro: Denies any pain at this time, alert and oriented with no neuro diagnoses however currently denies any memory of fall/mechanism leading to injury  Pain control: tylenol prn  Respiratory: Manubrial fx, pain control, IS/PT  CV: Hx of afib, metoprolol restarted, ASA on hold, F/u EKG, F/u echo   GI: Regular diet  Renal: r/o bladder injury, Urology placed thornton and reccomend flomax, started overnhight. MARIANO 2/2 BRENDA improving , recieved 2L LR in ED, continue LR@125, f/u am CPK to r/o rhabdo, lactate trending down  Heme: Pelvic hematoma, H/H stable, trend   MSK:  Left inferior, superior pubic rami fx non displaced manubrial fx, left proximal clavicle fx, distal clavicle fx. anterior acetabular wall and left pubic body fractures f/u ortho consult WBAT on LE , f/u left shoulder xray   ID: Leukocytosis, sepsis vs reactive, f/u UA, monitor for S&S of SIRS.  Endo: FS q6h     Overnight: no acute events, urology evaluation completed and started on flomax, stated no need for ct cysto from their pov, thornton placed with no gross hematuria and urine culture sent, plan to repeat ua to assess for persistent blood      DVT PTX:  HSQ    GI PTX: PTX    ***Tubes/Lines/Drains  ***  Peripheral IV  Central Venous Line     	Date   Arterial Line		                Date   [] PICC:         	[] Midline		[] Mediport             Urinary Catheter		Indication: Strict I&O    Date Placed:       REVIEW OF SYSTEMS    [X] A ten-point review of systems was otherwise negative except as noted.  [ ] Due to altered mental status/intubation, subjective information were not able to be obtained from the patient. History was obtained, to the extent possible, from review of the chart and collateral sources of information. KAMAR OCAMPO  [id number]  89y Female    Indication for ICU admission:  Admit Date:01-29-19  ICU Date:1/29    No Known Allergies    PAST MEDICAL & SURGICAL HISTORY:  Tricuspid regurgitation  Mitral regurgitation  HTN (hypertension)  Paroxysmal atrial fibrillation  Dislocation of shoulder region: L shoulder pins/surgery  Hip fracture: L hip replacement    Home Medications:  Metoprolol Succinate ER 50 mg oral tablet, extended release: 1 tab(s) orally once a day (29 Jan 2019 16:03)  timolol hemihydrate 0.5% ophthalmic solution: 1 dose(s) to each affected eye once a day (29 Jan 2019 16:03)        24HRS EVENT:    Neuro: Denies any pain at this time, alert and oriented with no neuro diagnoses however currently denies any memory of fall/mechanism leading to injury  Pain control: tylenol prn  Respiratory: Manubrial fx, pain control, IS/PT  CV: Hx of afib, metoprolol restarted, ASA on hold, F/u EKG, F/u echo   GI: Regular diet  Renal: r/o bladder injury, Urology placed thornton and reccomend flomax, started overnhight. MARIANO 2/2 BRENDA improving , recieved 2L LR in ED, continue LR@125, f/u am CPK to r/o rhabdo, lactate trending down  Heme: Pelvic hematoma, H/H stable, trend   MSK:  Left inferior, superior pubic rami fx non displaced manubrial fx, left proximal clavicle fx, distal clavicle fx. anterior acetabular wall and left pubic body fractures f/u ortho consult WBAT on LE , f/u left shoulder xray   ID: Leukocytosis, sepsis vs reactive, f/u UA, monitor for S&S of SIRS.  Endo: FS q6h     Overnight: no acute events, urology evaluation completed and started on flomax, stated no need for ct cysto from their pov, thornton placed with no gross hematuria and urine culture sent, plan to repeat ua to assess for persistent blood      DVT PTX:  HSQ    GI PTX: PTX    ***Tubes/Lines/Drains  ***  Peripheral IV  Central Venous Line     	Date   Arterial Line		                Date   [] PICC:         	[] Midline		[] Mediport             Urinary Catheter		Indication: Strict I&O    Date Placed:       REVIEW OF SYSTEMS    [X] A ten-point review of systems was otherwise negative except as noted.  [ ] Due to altered mental status/intubation, subjective information were not able to be obtained from the patient. History was obtained, to the extent possible, from review of the chart and collateral sources of information.    Daily Height in cm: 163.83 (29 Jan 2019 18:30)    Daily     Diet, Regular (01-29-19 @ 17:45)      CURRENT MEDS:  Neurologic Medications    Respiratory Medications    Cardiovascular Medications  metoprolol succinate ER 50 milliGRAM(s) Oral daily  tamsulosin 0.4 milliGRAM(s) Oral at bedtime    Gastrointestinal Medications  pantoprazole  Injectable 40 milliGRAM(s) IV Push daily  sodium chloride 0.9%. 1000 milliLiter(s) IV Continuous <Continuous>    Genitourinary Medications    Hematologic/Oncologic Medications  heparin  Injectable 5000 Unit(s) SubCutaneous every 8 hours    Antimicrobial/Immunologic Medications    Endocrine/Metabolic Medications    Topical/Other Medications  chlorhexidine 4% Liquid 1 Application(s) Topical <User Schedule>  timolol 0.5% Solution 1 Drop(s) Both EYES daily      ICU Vital Signs Last 24 Hrs  T(C): 36.3 (30 Jan 2019 04:00), Max: 36.3 (29 Jan 2019 20:00)  T(F): 97.4 (30 Jan 2019 04:00), Max: 97.4 (29 Jan 2019 20:00)  HR: 92 (30 Jan 2019 10:00) (60 - 120)  BP: 113/56 (30 Jan 2019 10:00) (82/45 - 139/102)  BP(mean): 75 (30 Jan 2019 10:00) (55 - 128)  ABP: --  ABP(mean): --  RR: 28 (30 Jan 2019 10:00) (13 - 34)  SpO2: 97% (30 Jan 2019 10:00) (89% - 100%)      Adult Advanced Hemodynamics Last 24 Hrs  CVP(mm Hg): --  CVP(cm H2O): --  CO: --  CI: --  PA: --  PA(mean): --  PCWP: --  SVR: --  SVRI: --  PVR: --  PVRI: --      ABG - ( 30 Jan 2019 05:14 )  pH, Arterial: 7.49  pH, Blood: x     /  pCO2: 31    /  pO2: 58    / HCO3: 24    / Base Excess: 1.0   /  SaO2: 93                  I&O's Summary    29 Jan 2019 07:01  -  30 Jan 2019 07:00  --------------------------------------------------------  IN: 3450 mL / OUT: 770 mL / NET: 2680 mL    30 Jan 2019 07:01  -  30 Jan 2019 10:05  --------------------------------------------------------  IN: 0 mL / OUT: 50 mL / NET: -50 mL      I&O's Detail    29 Jan 2019 07:01  -  30 Jan 2019 07:00  --------------------------------------------------------  IN:    Lactated Ringers IV Bolus: 2000 mL    sodium chloride 0.9%: 1375 mL    sodium chloride 0.9%.: 75 mL  Total IN: 3450 mL    OUT:    Indwelling Catheter - Urethral: 770 mL  Total OUT: 770 mL    Total NET: 2680 mL      30 Jan 2019 07:01  -  30 Jan 2019 10:05  --------------------------------------------------------  IN:  Total IN: 0 mL    OUT:    Indwelling Catheter - Urethral: 50 mL  Total OUT: 50 mL    Total NET: -50 mL          PHYSICAL EXAM:    General/Neuro  RASS:             GCS:     = E   / V   / M      Deficits:                             alert & oriented x 3, no focal deficits  Pupils:    Lungs:      clear to auscultation, Normal expansion/effort.     Cardiovascular : S1, S2.  Regular rate and rhythm.  Peripheral edema   Cardiac Rhythm: Normal Sinus Rhythm    GI: Abdomen soft, Non-tender, Non-distended.    Gastrostomy / Jejunostomy tube in place.  Nasogastric tube in place.  Colostomy / Ileostomy.    Wound:    Extremities: Extremities warm, pink, well-perfused. Pulses:Rt     Lt    Derm: Good skin turgor, no skin breakdown.      :       Thornton catheter in place.      CXR:     LABS:  CAPILLARY BLOOD GLUCOSE      POCT Blood Glucose.: 95 mg/dL (30 Jan 2019 05:57)                          9.4    13.12 )-----------( 139      ( 30 Jan 2019 05:30 )             27.2       01-30    143  |  105  |  38<H>  ----------------------------<  103<H>  4.5   |  22  |  1.3    Ca    8.6      30 Jan 2019 05:30  Phos  2.7     01-30  Mg     1.8     01-30    TPro  7.2  /  Alb  4.2  /  TBili  0.9  /  DBili  x   /  AST  40  /  ALT  17  /  AlkPhos  62  01-29      PT/INR - ( 29 Jan 2019 12:41 )   PT: 13.70 sec;   INR: 1.19 ratio         PTT - ( 29 Jan 2019 12:41 )  PTT:30.9 sec  CARDIAC MARKERS ( 30 Jan 2019 01:03 )  x     / x     / 41 U/L / x     / x      CARDIAC MARKERS ( 29 Jan 2019 12:41 )  x     / <0.01 ng/mL / 102 U/L / x     / x          Urinalysis Basic - ( 30 Jan 2019 01:12 )    Color: Yellow / Appearance: Clear / SG: >=1.030 / pH: x  Gluc: x / Ketone: Negative  / Bili: Negative / Urobili: 1.0   Blood: x / Protein: Trace / Nitrite: Negative   Leuk Esterase: Negative / RBC: 6-10 /HPF / WBC x   Sq Epi: x / Non Sq Epi: x / Bacteria: x KAMAR OCAMPO  [id number]  89y Female    Indication for ICU admission:  Admit Date:01-29-19  ICU Date:1/29    No Known Allergies    PAST MEDICAL & SURGICAL HISTORY:  Tricuspid regurgitation  Mitral regurgitation  HTN (hypertension)  Paroxysmal atrial fibrillation  Dislocation of shoulder region: L shoulder pins/surgery  Hip fracture: L hip replacement    Home Medications:  Metoprolol Succinate ER 50 mg oral tablet, extended release: 1 tab(s) orally once a day (29 Jan 2019 16:03)  timolol hemihydrate 0.5% ophthalmic solution: 1 dose(s) to each affected eye once a day (29 Jan 2019 16:03)        24HRS EVENT:    Neuro: Denies any pain at this time, alert and oriented with no neuro diagnoses however currently denies any memory of fall/mechanism leading to injury  Pain control: tylenol prn  Respiratory: Manubrial fx, pain control, IS/PT  CV: Hx of afib, metoprolol restarted, ASA on hold, F/u EKG, F/u echo   GI: Regular diet  Renal: r/o bladder injury, Urology placed thornton and reccomend flomax, started overnhight. MARIANO 2/2 BRENDA improving , recieved 2L LR in ED, continue LR@125, f/u am CPK to r/o rhabdo, lactate trending down  Heme: Pelvic hematoma, H/H stable, trend   MSK:  Left inferior, superior pubic rami fx non displaced manubrial fx, left proximal clavicle fx, distal clavicle fx. anterior acetabular wall and left pubic body fractures f/u ortho consult WBAT on LE , f/u left shoulder xray   ID: Leukocytosis, sepsis vs reactive, f/u UA, monitor for S&S of SIRS.  Endo: FS q6h     Overnight: no acute events, urology evaluation completed and started on flomax, stated no need for ct cysto from their pov, thornton placed with no gross hematuria and urine culture sent, plan to repeat ua to assess for persistent blood      DVT PTX:  HSQ    GI PTX: PTX    ***Tubes/Lines/Drains  ***  Peripheral IV  Central Venous Line     	Date   Arterial Line		                Date   [] PICC:         	[] Midline		[] Mediport             Urinary Catheter		Indication: Strict I&O    Date Placed:       REVIEW OF SYSTEMS    [X] A ten-point review of systems was otherwise negative except as noted.  [ ] Due to altered mental status/intubation, subjective information were not able to be obtained from the patient. History was obtained, to the extent possible, from review of the chart and collateral sources of information.    Daily Height in cm: 163.83 (29 Jan 2019 18:30)    Daily     Diet, Regular (01-29-19 @ 17:45)      CURRENT MEDS:  Neurologic Medications    Respiratory Medications    Cardiovascular Medications  metoprolol succinate ER 50 milliGRAM(s) Oral daily  tamsulosin 0.4 milliGRAM(s) Oral at bedtime    Gastrointestinal Medications  pantoprazole  Injectable 40 milliGRAM(s) IV Push daily  sodium chloride 0.9%. 1000 milliLiter(s) IV Continuous <Continuous>    Genitourinary Medications    Hematologic/Oncologic Medications  heparin  Injectable 5000 Unit(s) SubCutaneous every 8 hours    Antimicrobial/Immunologic Medications    Endocrine/Metabolic Medications    Topical/Other Medications  chlorhexidine 4% Liquid 1 Application(s) Topical <User Schedule>  timolol 0.5% Solution 1 Drop(s) Both EYES daily      ICU Vital Signs Last 24 Hrs  T(C): 36.3 (30 Jan 2019 04:00), Max: 36.3 (29 Jan 2019 20:00)  T(F): 97.4 (30 Jan 2019 04:00), Max: 97.4 (29 Jan 2019 20:00)  HR: 92 (30 Jan 2019 10:00) (60 - 120)  BP: 113/56 (30 Jan 2019 10:00) (82/45 - 139/102)  BP(mean): 75 (30 Jan 2019 10:00) (55 - 128)  ABP: --  ABP(mean): --  RR: 28 (30 Jan 2019 10:00) (13 - 34)  SpO2: 97% (30 Jan 2019 10:00) (89% - 100%)      Adult Advanced Hemodynamics Last 24 Hrs  CVP(mm Hg): --  CVP(cm H2O): --  CO: --  CI: --  PA: --  PA(mean): --  PCWP: --  SVR: --  SVRI: --  PVR: --  PVRI: --      ABG - ( 30 Jan 2019 05:14 )  pH, Arterial: 7.49  pH, Blood: x     /  pCO2: 31    /  pO2: 58    / HCO3: 24    / Base Excess: 1.0   /  SaO2: 93                  I&O's Summary    29 Jan 2019 07:01  -  30 Jan 2019 07:00  --------------------------------------------------------  IN: 3450 mL / OUT: 770 mL / NET: 2680 mL    30 Jan 2019 07:01  -  30 Jan 2019 10:05  --------------------------------------------------------  IN: 0 mL / OUT: 50 mL / NET: -50 mL      I&O's Detail    29 Jan 2019 07:01  -  30 Jan 2019 07:00  --------------------------------------------------------  IN:    Lactated Ringers IV Bolus: 2000 mL    sodium chloride 0.9%: 1375 mL    sodium chloride 0.9%.: 75 mL  Total IN: 3450 mL    OUT:    Indwelling Catheter - Urethral: 770 mL  Total OUT: 770 mL    Total NET: 2680 mL      30 Jan 2019 07:01  -  30 Jan 2019 10:05  --------------------------------------------------------  IN:  Total IN: 0 mL    OUT:    Indwelling Catheter - Urethral: 50 mL  Total OUT: 50 mL    Total NET: -50 mL          PHYSICAL EXAM:    General/Neuro  RASS: 0            GCS:   15    Deficits:  alert & oriented x 3, no focal deficits. Able to lift up legs off floor and hold, extend and flex knee against resistance. BL  strength 4+/5. Sitting in a chair,   Pupils equal, round.     Lungs: With talking 88% but poor waveform.   clear to auscultation, Normal expansion/effort. Left chest wall ecchymosis.     Cardiovascular : S1, S2.  irregularly irregular, no noted murmurs. No peripheral edema.    GI: Abdomen soft, Non-tender, Non-distended.      Extremities: Extremities warm, pink, well-perfused. Sensory and motor grossly intact BL upper and lower extremities.   Significant ecchymosis on left shoulder, clavicular area. Tenderness and limited motion of left shoulder, left arm sling in place.     Derm: Good skin turgor, no skin breakdown.  Noted ecchymosis on left chest wall.     :     Thornton catheter in place, pale yellow urine       CXR:     LABS:  CAPILLARY BLOOD GLUCOSE      POCT Blood Glucose.: 95 mg/dL (30 Jan 2019 05:57)                          9.4    13.12 )-----------( 139      ( 30 Jan 2019 05:30 )             27.2       01-30    143  |  105  |  38<H>  ----------------------------<  103<H>  4.5   |  22  |  1.3    Ca    8.6      30 Jan 2019 05:30  Phos  2.7     01-30  Mg     1.8     01-30    TPro  7.2  /  Alb  4.2  /  TBili  0.9  /  DBili  x   /  AST  40  /  ALT  17  /  AlkPhos  62  01-29      PT/INR - ( 29 Jan 2019 12:41 )   PT: 13.70 sec;   INR: 1.19 ratio         PTT - ( 29 Jan 2019 12:41 )  PTT:30.9 sec  CARDIAC MARKERS ( 30 Jan 2019 01:03 )  x     / x     / 41 U/L / x     / x      CARDIAC MARKERS ( 29 Jan 2019 12:41 )  x     / <0.01 ng/mL / 102 U/L / x     / x          Urinalysis Basic - ( 30 Jan 2019 01:12 )    Color: Yellow / Appearance: Clear / SG: >=1.030 / pH: x  Gluc: x / Ketone: Negative  / Bili: Negative / Urobili: 1.0   Blood: x / Protein: Trace / Nitrite: Negative   Leuk Esterase: Negative / RBC: 6-10 /HPF / WBC x   Sq Epi: x / Non Sq Epi: x / Bacteria: x

## 2019-01-30 NOTE — CONSULT NOTE ADULT - ASSESSMENT
IMPRESSION: Rehab of multitrauma    PRECAUTIONS: [ x   ] Cardiac  [    ] Respiratory  [    ] Seizures [    ] Contact Isolation  [    ] Droplet Isolation  [    ] Other    Weight Bearing Status: NWB LUE IN SLING, LLE WBAT    RECOMMENDATION:    Out of Bed to Chair     DVT/Decubiti Prophylaxis    REHAB PLAN: BP 80/50- WOULD TREAT WITH SYST BP OVER 100    [ x    ] Bedside P/T 3-5 times a week   [     ] Bedside O/T  2-3 times a week   [     ] No Rehab Therapy Indicated   [     ]  Speech Therapy   Conditioning/ROM                                 ADL  Bed Mobility                                            Conditioning/ROM  Transfers                                                  Bed Mobility  Sitting /Standing Balance                      Transfers                                        Gait Training                                            Sitting/Standing Balance  Stair Training [   ]Applicable                 Home equipment Eval                                                                     Splinting  [   ] Only      GOALS:   ADL   [ x   ]   Independent         Transfers  [  x] Independent            Ambulation  [     x] Independent     [  x   ] With device                            [    ]  CG                                               [    ]  CG                                                    [     ] CG                            [    ] Min A                                          [    ] Min A                                                [     ] Min  A          DISCHARGE PLAN:   [     ]  Good candidate for Intensive Rehabilitation/Hospital based                                             Will tolerate 3hrs Intensive Rehab Daily                                       [   x   ]  Short Term Rehab in Skilled Nursing Facility                                       [      ]  Home with Outpatient or  services                                         [      ]  Possible Candidate for Intensive Hospital based Rehab

## 2019-01-30 NOTE — CONSULT NOTE ADULT - CONSULT REASON
s/p mechanical fall, multiple injuries
Pubic rami and clavicle fx
Retention status post the fall with pelvic fractures, question of bladder hematoma, previous attempts feel the blood
fall/ multitrauma
pelvic hematoma

## 2019-01-30 NOTE — CONSULT NOTE ADULT - REASON FOR ADMISSION
s/p unwitnessed fall, ?HT, ?LOC, -AC

## 2019-01-30 NOTE — CONSULT NOTE ADULT - SUBJECTIVE AND OBJECTIVE BOX
Patient is a 89y old  Female who presents with a chief complaint of s/p unwitnessed fall, ?HT, ?LOC, -AC (30 Jan 2019 11:01)    HPI:  89 year old female with PMH significant for Afib, HTN, tricuspid regurg, and mitral regurg presents to the ED s/p unwitnessed fall.  Patient states that she does not remember falling, she states that she was ambulating around her home yesterday evening, this morning she woke up around 5am and went to the bathroom, went to get coffee, and then went back to bed.  However, patient's daughter states that she was not able to get out of bed this morning and she then called EMS.  Patient's daughter states that the hematoma that was present across the left side of her chest was not present a few days ago.  Patient denies any falls in the past few days.  Patient complains of pain near her hips when trying to lift her legs, otherwise denies chest pain, shortness of breath, nausea, vomiting, fever, chills, abdominal pain. (29 Jan 2019 15:55)      PAST MEDICAL & SURGICAL HISTORY:  Tricuspid regurgitation  Mitral regurgitation  HTN (hypertension)  Paroxysmal atrial fibrillation  Dislocation of shoulder region: L shoulder pins/surgery  Hip fracture: L hip replacement      Hospital Course: trauma [facility]-L  pubic rami fractures,L clavicle fx and  bladder hematoma   Left inferior, superior pubic rami fx, non displaced manubrial fx, left proximal clavicle fx, distal clavicle fx: pain control with ATC Tylenol and reassess. Motor and sensory intact    Significant ecchymosis on left chest and shoulder. Holding home ASA  - Monitor HD, Maintain MAP >65.      EKG - afib with rvr and incomplete rbb, echo pending     Has left upper extremity sling. WBAT LLE, and NWB in LUE with sling.TODAY'S SUBJECTIVE & REVIEW OF SYMPTOMS:     Constitutional Weakness   Cardio WNL   Resp WNL   GI WNL  Heme WNL  Endo WNL  Skin WNL  MSK pain  Neuro WNL  Cognitive WNL  Psych WNL      MEDICATIONS  (STANDING):  aspirin enteric coated 81 milliGRAM(s) Oral daily  chlorhexidine 4% Liquid 1 Application(s) Topical <User Schedule>  heparin  Injectable 5000 Unit(s) SubCutaneous every 8 hours  metoprolol succinate ER 50 milliGRAM(s) Oral daily  pantoprazole    Tablet 40 milliGRAM(s) Oral daily  sodium chloride 0.9%. 1000 milliLiter(s) (50 mL/Hr) IV Continuous <Continuous>  tamsulosin 0.4 milliGRAM(s) Oral at bedtime  timolol 0.5% Solution 1 Drop(s) Both EYES daily    MEDICATIONS  (PRN):  acetaminophen   Tablet .. 650 milliGRAM(s) Oral every 6 hours PRN Mild Pain (1 - 3), Moderate Pain (4 - 6)      FAMILY HISTORY:      Allergies    No Known Allergies    Intolerances        SOCIAL HISTORY:    [    ] Etoh  [    ] Smoking  [    ] Substance abuse     Home Environment:  [  x  ] Home Alone  [    ] Lives with Family  [    ] Home Health Aid    Dwelling:  [  x] Apartment  [    ] Private House  [    ] Adult Home  [    ] Skilled Nursing Facility      [    ] Short Term  [    ] Long Term  [    ] Stairs                           [ x   ] Elevator     FUNCTIONAL STATUS PTA: (Check all that apply)  Ambulation: [  x   ]Independent    [    ] Dependent     [    ] Non-Ambulatory  Assistive Device: [ x   ] SA Cane  [    ]  Q Cane  [    ] Walker  [    ]  Wheelchair  ADL : [  x  ] Independent  [    ]  Dependent   NO HHA    Vital Signs Last 24 Hrs  T(C): 36.8 (30 Jan 2019 08:00), Max: 36.8 (30 Jan 2019 08:00)  T(F): 98.2 (30 Jan 2019 08:00), Max: 98.2 (30 Jan 2019 08:00)  HR: 76 (30 Jan 2019 12:04) (68 - 120)  BP: 84/41 (30 Jan 2019 12:04) (82/45 - 139/102)  BP(mean): 52 (30 Jan 2019 12:04) (52 - 128)  RR: 32 (30 Jan 2019 12:04) (13 - 34)  SpO2: 96% (30 Jan 2019 12:04) (89% - 100%)      PHYSICAL EXAM: Alert & Oriented X3  GENERAL: NAD, well-groomed, well-developed  HEAD:  Atraumatic, Normocephalic  EYES: EOMI, PERRLA, conjunctiva and sclera clear  NECK: Supple, No JVD, Normal thyroid  CHEST/LUNG: Clear bilaterally  HEART: S1S2 irregirreg  ABDOMEN: Soft, Nontender, Nondistended; Bowel sounds present  EXTREMITIES:L ant chest wall/shoulder ecchymoses, LUE in sling    NERVOUS SYSTEM:  Cranial Nerves 2-12 intact [   x ] Abnormal  [    ]  ROM: WFL all extremities [    ]  Abnormal [ x    ]L shoulder not tested  Motor Strength: WFL all extremities  [    ]  Abnormal [ x   ]L shoulder not tested-L hip flexion3/5 with pain groin  Sensation: intact to light touch [   x ] Abnormal [    ]      FUNCTIONAL STATUS:  Bed Mobility: [   ]  Independent [    ]  Supervision [  x  ]  Needs Assistance [  ]  N/A  Transfers: [    ]  Independent [    ]  Supervision [    ]  Needs Assistance [ x   ]  N/A    Ambulation:  [    ]  Independent [    ]  Supervision [    ]  Needs Assistance [ x   ]  N/A   ADL:  [    ]   Independent [  x  ] Requires Assistance [    ] N/A       LABS:                        9.4    13.12 )-----------( 139      ( 30 Jan 2019 05:30 )             27.2     01-30    143  |  105  |  38<H>  ----------------------------<  103<H>  4.5   |  22  |  1.3    Ca    8.6      30 Jan 2019 05:30  Phos  2.7     01-30  Mg     1.8     01-30    TPro  7.2  /  Alb  4.2  /  TBili  0.9  /  DBili  x   /  AST  40  /  ALT  17  /  AlkPhos  62  01-29    PT/INR - ( 29 Jan 2019 12:41 )   PT: 13.70 sec;   INR: 1.19 ratio         PTT - ( 29 Jan 2019 12:41 )  PTT:30.9 sec  Urinalysis Basic - ( 30 Jan 2019 01:12 )    Color: Yellow / Appearance: Clear / SG: >=1.030 / pH: x  Gluc: x / Ketone: Negative  / Bili: Negative / Urobili: 1.0   Blood: x / Protein: Trace / Nitrite: Negative   Leuk Esterase: Negative / RBC: 6-10 /HPF / WBC x   Sq Epi: x / Non Sq Epi: x / Bacteria: x        RADIOLOGY & ADDITIONAL STUDIES:

## 2019-01-30 NOTE — PHYSICAL THERAPY INITIAL EVALUATION ADULT - GENERAL OBSERVATIONS, REHAB EVAL
13:15-14:00 45 min   pt rec in bed in NAD, all lines intact, BP was running low 2* as per RN fluids were stopped, BP at end of session was 90/51

## 2019-01-30 NOTE — PROGRESS NOTE ADULT - SUBJECTIVE AND OBJECTIVE BOX
Pt is a 89 y.o female s/p fall with pelvic fractures found to have bladder hematoma on CT A/P, s/p thornton placement by  last night. Pt seen and examined at bedside, doing well. Pt denies any abdominal pain/discomfort.     Vital Signs: T(F): 97.4 (30 Jan 2019 04:00), Max: 97.4 (29 Jan 2019 20:00), HR: 92, BP: 113/56, RR: 28, SpO2: 97%  GEN: NAD, awake and alert  ABDO: soft, NT, ND. no palpable bladder. + indwelling thornton in place, draining clear yellow urine, no clots.                          9.4    13.12 )-----------( 139      ( 30 Jan 2019 05:30 )             27.2

## 2019-01-31 LAB
ANION GAP SERPL CALC-SCNC: 14 MMOL/L — SIGNIFICANT CHANGE UP (ref 7–14)
BUN SERPL-MCNC: 41 MG/DL — HIGH (ref 10–20)
CALCIUM SERPL-MCNC: 8.7 MG/DL — SIGNIFICANT CHANGE UP (ref 8.5–10.1)
CHLORIDE SERPL-SCNC: 102 MMOL/L — SIGNIFICANT CHANGE UP (ref 98–110)
CO2 SERPL-SCNC: 22 MMOL/L — SIGNIFICANT CHANGE UP (ref 17–32)
CREAT SERPL-MCNC: 1.2 MG/DL — SIGNIFICANT CHANGE UP (ref 0.7–1.5)
GLUCOSE SERPL-MCNC: 123 MG/DL — HIGH (ref 70–99)
HCT VFR BLD CALC: 27.9 % — LOW (ref 37–47)
HGB BLD-MCNC: 9.2 G/DL — LOW (ref 12–16)
MAGNESIUM SERPL-MCNC: 2.4 MG/DL — SIGNIFICANT CHANGE UP (ref 1.8–2.4)
MCHC RBC-ENTMCNC: 32.7 PG — HIGH (ref 27–31)
MCHC RBC-ENTMCNC: 33 G/DL — SIGNIFICANT CHANGE UP (ref 32–37)
MCV RBC AUTO: 99.3 FL — HIGH (ref 81–99)
NRBC # BLD: 0 /100 WBCS — SIGNIFICANT CHANGE UP (ref 0–0)
PHOSPHATE SERPL-MCNC: 3.4 MG/DL — SIGNIFICANT CHANGE UP (ref 2.1–4.9)
PLATELET # BLD AUTO: 138 K/UL — SIGNIFICANT CHANGE UP (ref 130–400)
POTASSIUM SERPL-MCNC: 4.8 MMOL/L — SIGNIFICANT CHANGE UP (ref 3.5–5)
POTASSIUM SERPL-SCNC: 4.8 MMOL/L — SIGNIFICANT CHANGE UP (ref 3.5–5)
RBC # BLD: 2.81 M/UL — LOW (ref 4.2–5.4)
RBC # FLD: 13.8 % — SIGNIFICANT CHANGE UP (ref 11.5–14.5)
SODIUM SERPL-SCNC: 138 MMOL/L — SIGNIFICANT CHANGE UP (ref 135–146)
WBC # BLD: 14.17 K/UL — HIGH (ref 4.8–10.8)
WBC # FLD AUTO: 14.17 K/UL — HIGH (ref 4.8–10.8)

## 2019-01-31 PROCEDURE — 99233 SBSQ HOSP IP/OBS HIGH 50: CPT

## 2019-01-31 RX ORDER — ALBUTEROL 90 UG/1
2.5 AEROSOL, METERED ORAL ONCE
Qty: 0 | Refills: 0 | Status: COMPLETED | OUTPATIENT
Start: 2019-01-31 | End: 2019-01-31

## 2019-01-31 RX ADMIN — Medication 1 DROP(S): at 13:02

## 2019-01-31 RX ADMIN — HEPARIN SODIUM 5000 UNIT(S): 5000 INJECTION INTRAVENOUS; SUBCUTANEOUS at 21:38

## 2019-01-31 RX ADMIN — HEPARIN SODIUM 5000 UNIT(S): 5000 INJECTION INTRAVENOUS; SUBCUTANEOUS at 05:34

## 2019-01-31 RX ADMIN — HEPARIN SODIUM 5000 UNIT(S): 5000 INJECTION INTRAVENOUS; SUBCUTANEOUS at 13:03

## 2019-01-31 RX ADMIN — Medication 37.5 MILLIGRAM(S): at 05:35

## 2019-01-31 RX ADMIN — Medication 81 MILLIGRAM(S): at 13:02

## 2019-01-31 RX ADMIN — PANTOPRAZOLE SODIUM 40 MILLIGRAM(S): 20 TABLET, DELAYED RELEASE ORAL at 13:03

## 2019-01-31 RX ADMIN — CHLORHEXIDINE GLUCONATE 1 APPLICATION(S): 213 SOLUTION TOPICAL at 05:34

## 2019-01-31 RX ADMIN — ALBUTEROL 2.5 MILLIGRAM(S): 90 AEROSOL, METERED ORAL at 10:42

## 2019-01-31 NOTE — PROGRESS NOTE ADULT - SUBJECTIVE AND OBJECTIVE BOX
KAMAR OCAMPO MRN-[id number]    Hospitalist Note  89 year old female with Past Medical History Afib, HTN, severe TR, and moderate MR admitted status post fall.  Her hospital course was complicated by multiple pelvic fractures, fracture of the manubrium, and clavicular fracture.    Overnight events/Updates: Hospitalist service was consulted for dyspnea.  The patient appears comfortable upon examination this afternoon, and does not complain of shortness of breath.  She is currently saturating at 97% on room air.    Vital Signs Last 24 Hrs  T(C): 35.9 (31 Jan 2019 07:30), Max: 36.6 (31 Jan 2019 03:00)  T(F): 96.7 (31 Jan 2019 07:30), Max: 97.9 (31 Jan 2019 03:00)  HR: 95 (31 Jan 2019 10:23) (72 - 95)  BP: 126/80 (31 Jan 2019 15:06) (100/60 - 140/67)  BP(mean): --  RR: 20 (31 Jan 2019 10:23) (18 - 20)  SpO2: 91% (31 Jan 2019 15:06) (91% - 91%)    Physical Examination:  General: AAO x 3  HEENT: PERRLA, EOMI  CV= S1 & S2 appreciated  Lungs=CTA BL  Abdominal Examination= + BS, Soft, NT/ND, + thornton  Extremity Examination= splint to the left upper extremity with extensive ecchymosis    ROS: No chest pain, no shortness of breath.  All other systems reviewed and are within normal limits except for the complaints in the HPI.    MEDICATIONS  (STANDING):  aspirin enteric coated 81 milliGRAM(s) Oral daily  chlorhexidine 4% Liquid 1 Application(s) Topical <User Schedule>  heparin  Injectable 5000 Unit(s) SubCutaneous every 8 hours  metoprolol succinate ER 37.5 milliGRAM(s) Oral daily  pantoprazole    Tablet 40 milliGRAM(s) Oral daily  timolol 0.5% Solution 1 Drop(s) Both EYES daily    MEDICATIONS  (PRN):  acetaminophen   Tablet .. 650 milliGRAM(s) Oral every 6 hours PRN Mild Pain (1 - 3), Moderate Pain (4 - 6)                            9.2    14.17 )-----------( 138      ( 31 Jan 2019 01:01 )             27.9     01-31    138  |  102  |  41<H>  ----------------------------<  123<H>  4.8   |  22  |  1.2    Ca    8.7      31 Jan 2019 01:01  Phos  3.4     01-31  Mg     2.4     01-31        Case discussed with housestaff & family  ISIDRO Phillips [phone]

## 2019-01-31 NOTE — DIETITIAN INITIAL EVALUATION ADULT. - ENERGY NEEDS
[id number] kcal/day (MSJ x 1.2-1.4)  51-65 g/day (1.1-1.4 g/kg of ABW) - preserve lean muscle mass  1ml/kcal or per LIP

## 2019-01-31 NOTE — DIETITIAN INITIAL EVALUATION ADULT. - DIET TYPE
regular/patient ate only 2 orange juice with coffee. Feeling tired not very hungry. But usually she is a BIG eater.

## 2019-01-31 NOTE — DIETITIAN INITIAL EVALUATION ADULT. - PHYSICAL APPEARANCE
BMI is 17.4 (pt says her weight has always been the same. last PMD visit was couple months ago and she was weighed there)./well nourished

## 2019-01-31 NOTE — DIETITIAN INITIAL EVALUATION ADULT. - OTHER INFO
s/p fall at home. Trauma sx consulted now tolerating diet. ambulating well. BP stable. maintain thornton for now per urology. No other intervention at this time. Ortho consulted f/u L clavicle + shoulder x ray. PT, SW consult. per this morning assessment pt demonstrated some wheezing and likely a fib. LIP aware, possible start meds and nebs?

## 2019-01-31 NOTE — DIETITIAN INITIAL EVALUATION ADULT. - ORAL INTAKE PTA
PTA at home she cooks for herself and a BIG eater lover of all foods and she buys herself ensure q24hr. One Vitamin a day. NKFA/good

## 2019-01-31 NOTE — PROGRESS NOTE ADULT - ASSESSMENT
A/P:  KAMAR OCAMPO is a 89yFemale HD/POD___ from . She is currently    Plan: A/P:  KAMAR OCAMPO is a 89yFemale HD2 s/p fall with multiple fractures    Plan:   -complete syncope workup  -f/u with urology for plan with thornton  -BP monitoring  -DVT/GI ppx  -regular diet  -dispo planning

## 2019-01-31 NOTE — PROGRESS NOTE ADULT - ASSESSMENT
89 year old female with Past Medical History Afib, HTN, severe TR, and moderate MR admitted status post fall.  Her hospital course was complicated by multiple pelvic fractures, fracture of the manubrium, and clavicular fracture.    Fall with multiple fractures: patient does not recall events prior to her fall.  Check orthostatic blood pressure.  Continue Tylenol PRN  Dyspnea secondary to pulmonary hypertension/valvular disease vs. atelectasis: repeat pulse ox was 97% on room air.  The patient appears comfortable upon examination this afternoon; consider cardiology evaluation if symptoms worsen.  Encourage use of incentive spirometer.  Bladder Wall hematoma:  recommendations were reviewed; continue thornton to drainage for now.  No evidence of hematuria.  Atrial Fibrillation: continue Lopressor  GI/DVT prophylaxis  Follow-up with physical therapy for disposition

## 2019-01-31 NOTE — PROGRESS NOTE ADULT - SUBJECTIVE AND OBJECTIVE BOX
TRAUMA SURGERY PROGRESS NOTE     TERRENCE KAMAR  [address] [place] [address] day :2d   Surgical Attending: Jeff Gave  Overnight events: No acute events overnight. Patient tolerating diet, ambulating comfortably. Blood pressure has improved on fluids.       T(F): 97.9 (01-31-19 @ 03:00), Max: 98.2 (01-30-19 @ 08:00)  HR: 95 (01-31-19 @ 03:00) (68 - 95)  BP: 140/67 (01-31-19 @ 03:00) (81/48 - 140/67)  ABP: --  ABP(mean): --  RR: 19 (01-31-19 @ 03:00) (17 - 35)  SpO2: 93% (01-30-19 @ 15:00) (91% - 97%)      01-29-19 @ 07:01  -  01-30-19 @ 07:00  --------------------------------------------------------  IN:    Lactated Ringers IV Bolus: 2000 mL    sodium chloride 0.9%: 1375 mL    sodium chloride 0.9%.: 75 mL  Total IN: 3450 mL    OUT:    Indwelling Catheter - Urethral: 770 mL  Total OUT: 770 mL    Total NET: 2680 mL      01-30-19 @ 07:01  -  01-31-19 @ 05:38  --------------------------------------------------------  IN:    Oral Fluid: 200 mL    sodium chloride 0.9%.: 700 mL  Total IN: 900 mL    OUT:    Indwelling Catheter - Urethral: 520 mL  Total OUT: 520 mL    Total NET: 380 mL        DIET/FLUIDS: sodium chloride 0.9%. 1000 milliLiter(s) IV Continuous <Continuous>      URINE:   01-29-19 @ 07:01 - 01-30-19 @ 07:00  --------------------------------------------------------  OUT: 770 mL     Indwelling Urethral Catheter:     Connect To:  Straight Drainage/Gravity    Indication:  Urine Output Monitoring in Critically Ill    Additional Instructions:  Bladder hematoma (01-30-19 @ 11:40)    GI proph:  pantoprazole    Tablet 40 milliGRAM(s) Oral daily    AC/ proph: aspirin enteric coated 81 milliGRAM(s) Oral daily  heparin  Injectable 5000 Unit(s) SubCutaneous every 8 hours    ABx:     PHYSICAL EXAM:  GENERAL: NAD, well-appearing  CHEST/LUNG: Clear to auscultation bilaterally  HEART: Regular rate and rhythm  ABDOMEN: Soft, Nontender, Nondistended;         LABS  Labs:  CAPILLARY BLOOD GLUCOSE      POCT Blood Glucose.: 95 mg/dL (30 Jan 2019 05:57)                          9.2    14.17 )-----------( 138      ( 31 Jan 2019 01:01 )             27.9         01-31    138  |  102  |  41<H>  ----------------------------<  123<H>  4.8   |  22  |  1.2      Calcium, Total Serum: 8.7 mg/dL (01-31-19 @ 01:01)      LFTs:             7.2  | 0.9  | 40       ------------------[62      ( 29 Jan 2019 12:41 )  4.2  | x    | 17          Lipase:20     Amylase:x         Blood Gas Arterial, Lactate: 0.9 mmoL/L (01-30-19 @ 05:14)  Blood Gas Arterial, Lactate: 0.9 mmoL/L (01-30-19 @ 04:33)  Lactate, Blood: 2.0 mmol/L (01-30-19 @ 01:03)  Lactate, Blood: tnp mmol/L (01-30-19 @ 01:03)  Blood Gas Arterial, Lactate: 3.3 mmoL/L (01-29-19 @ 22:25)  Lactate, Blood: 5.7 mmol/L (01-29-19 @ 20:50)  Lactate, Blood: 5.6 mmol/L (01-29-19 @ 12:41)    ABG - ( 30 Jan 2019 05:14 )  pH: 7.49  /  pCO2: 31    /  pO2: 58    / HCO3: 24    / Base Excess: 1.0   /  SaO2: 93              ABG - ( 30 Jan 2019 04:33 )  pH: 7.49  /  pCO2: 31    /  pO2: 58    / HCO3: 24    / Base Excess: 1.0   /  SaO2: 93              ABG - ( 29 Jan 2019 22:25 )  pH: 7.47  /  pCO2: 33    /  pO2: 44    / HCO3: 24    / Base Excess: 0.3   /  SaO2: 82                Coags:     13.70  ----< 1.19    ( 29 Jan 2019 12:41 )     30.9        CARDIAC MARKERS ( 30 Jan 2019 01:03 )  x     / x     / 41 U/L / x     / x      CARDIAC MARKERS ( 29 Jan 2019 12:41 )  x     / <0.01 ng/mL / 102 U/L / x     / x              Urinalysis Basic - ( 30 Jan 2019 01:12 )    Color: Yellow / Appearance: Clear / SG: >=1.030 / pH: x  Gluc: x / Ketone: Negative  / Bili: Negative / Urobili: 1.0   Blood: x / Protein: Trace / Nitrite: Negative   Leuk Esterase: Negative / RBC: 6-10 /HPF / WBC x   Sq Epi: x / Non Sq Epi: x / Bacteria: x        Culture - Urine (collected 29 Jan 2019 21:10)  Source: .Urine Catheterized  Final Report (30 Jan 2019 21:40):    No growth

## 2019-02-01 LAB
ANION GAP SERPL CALC-SCNC: 14 MMOL/L — SIGNIFICANT CHANGE UP (ref 7–14)
BASOPHILS # BLD AUTO: 0.02 K/UL — SIGNIFICANT CHANGE UP (ref 0–0.2)
BASOPHILS NFR BLD AUTO: 0.1 % — SIGNIFICANT CHANGE UP (ref 0–1)
BUN SERPL-MCNC: 38 MG/DL — HIGH (ref 10–20)
CALCIUM SERPL-MCNC: 8.6 MG/DL — SIGNIFICANT CHANGE UP (ref 8.5–10.1)
CHLORIDE SERPL-SCNC: 104 MMOL/L — SIGNIFICANT CHANGE UP (ref 98–110)
CO2 SERPL-SCNC: 21 MMOL/L — SIGNIFICANT CHANGE UP (ref 17–32)
CREAT SERPL-MCNC: 1.2 MG/DL — SIGNIFICANT CHANGE UP (ref 0.7–1.5)
EOSINOPHIL # BLD AUTO: 0.27 K/UL — SIGNIFICANT CHANGE UP (ref 0–0.7)
EOSINOPHIL NFR BLD AUTO: 2 % — SIGNIFICANT CHANGE UP (ref 0–8)
GLUCOSE SERPL-MCNC: 124 MG/DL — HIGH (ref 70–99)
HCT VFR BLD CALC: 26.7 % — LOW (ref 37–47)
HGB BLD-MCNC: 8.9 G/DL — LOW (ref 12–16)
IMM GRANULOCYTES NFR BLD AUTO: 0.5 % — HIGH (ref 0.1–0.3)
LYMPHOCYTES # BLD AUTO: 1.41 K/UL — SIGNIFICANT CHANGE UP (ref 1.2–3.4)
LYMPHOCYTES # BLD AUTO: 10.4 % — LOW (ref 20.5–51.1)
MAGNESIUM SERPL-MCNC: 2.3 MG/DL — SIGNIFICANT CHANGE UP (ref 1.8–2.4)
MCHC RBC-ENTMCNC: 32.8 PG — HIGH (ref 27–31)
MCHC RBC-ENTMCNC: 33.3 G/DL — SIGNIFICANT CHANGE UP (ref 32–37)
MCV RBC AUTO: 98.5 FL — SIGNIFICANT CHANGE UP (ref 81–99)
MONOCYTES # BLD AUTO: 1.26 K/UL — HIGH (ref 0.1–0.6)
MONOCYTES NFR BLD AUTO: 9.3 % — SIGNIFICANT CHANGE UP (ref 1.7–9.3)
NEUTROPHILS # BLD AUTO: 10.58 K/UL — HIGH (ref 1.4–6.5)
NEUTROPHILS NFR BLD AUTO: 77.7 % — HIGH (ref 42.2–75.2)
NRBC # BLD: 0 /100 WBCS — SIGNIFICANT CHANGE UP (ref 0–0)
PHOSPHATE SERPL-MCNC: 2.6 MG/DL — SIGNIFICANT CHANGE UP (ref 2.1–4.9)
PLATELET # BLD AUTO: 152 K/UL — SIGNIFICANT CHANGE UP (ref 130–400)
POTASSIUM SERPL-MCNC: 4.3 MMOL/L — SIGNIFICANT CHANGE UP (ref 3.5–5)
POTASSIUM SERPL-SCNC: 4.3 MMOL/L — SIGNIFICANT CHANGE UP (ref 3.5–5)
RBC # BLD: 2.71 M/UL — LOW (ref 4.2–5.4)
RBC # FLD: 13.7 % — SIGNIFICANT CHANGE UP (ref 11.5–14.5)
SODIUM SERPL-SCNC: 139 MMOL/L — SIGNIFICANT CHANGE UP (ref 135–146)
WBC # BLD: 13.61 K/UL — HIGH (ref 4.8–10.8)
WBC # FLD AUTO: 13.61 K/UL — HIGH (ref 4.8–10.8)

## 2019-02-01 PROCEDURE — 99233 SBSQ HOSP IP/OBS HIGH 50: CPT

## 2019-02-01 RX ADMIN — Medication 81 MILLIGRAM(S): at 13:36

## 2019-02-01 RX ADMIN — HEPARIN SODIUM 5000 UNIT(S): 5000 INJECTION INTRAVENOUS; SUBCUTANEOUS at 05:29

## 2019-02-01 RX ADMIN — HEPARIN SODIUM 5000 UNIT(S): 5000 INJECTION INTRAVENOUS; SUBCUTANEOUS at 13:36

## 2019-02-01 RX ADMIN — PANTOPRAZOLE SODIUM 40 MILLIGRAM(S): 20 TABLET, DELAYED RELEASE ORAL at 13:36

## 2019-02-01 RX ADMIN — CHLORHEXIDINE GLUCONATE 1 APPLICATION(S): 213 SOLUTION TOPICAL at 05:29

## 2019-02-01 RX ADMIN — HEPARIN SODIUM 5000 UNIT(S): 5000 INJECTION INTRAVENOUS; SUBCUTANEOUS at 22:25

## 2019-02-01 RX ADMIN — Medication 1 DROP(S): at 13:36

## 2019-02-01 RX ADMIN — Medication 37.5 MILLIGRAM(S): at 05:29

## 2019-02-01 NOTE — PROGRESS NOTE ADULT - SUBJECTIVE AND OBJECTIVE BOX
Progress Note: General Surgery  Patient: KAMAR OCAMPO , 89y (27-Feb-1915)Female   MRN: [id number]  Location: [address]  Visit: 01-29-19 Inpatient  Date: 02-01-19 @ 06:02  Hospital Day:   Post-op Day:     Procedure/Diagnosis:   Events over 24h:     Vitals: T(F): 97 (02-01-19 @ 04:03), Max: 100.1 (01-31-19 @ 16:00)  HR: 77 (02-01-19 @ 04:03)  BP: 140/73 (02-01-19 @ 04:03) (123/83 - 156/67)  RR: 18 (02-01-19 @ 00:00)  SpO2: --    In:   01-30-19 @ 07:01  -  01-31-19 @ 07:00  --------------------------------------------------------  IN: 900 mL    01-31-19 @ 07:01  -  02-01-19 @ 06:02  --------------------------------------------------------  IN: 0 mL      Out:   01-30-19 @ 07:01  -  01-31-19 @ 07:00  --------------------------------------------------------  OUT:    Indwelling Catheter - Urethral: 820 mL  Total OUT: 820 mL      01-31-19 @ 07:01  -  02-01-19 @ 06:02  --------------------------------------------------------  OUT:    Indwelling Catheter - Urethral: 650 mL  Total OUT: 650 mL        Net:   01-30-19 @ 07:01  -  01-31-19 @ 07:00  --------------------------------------------------------  NET: 80 mL    01-31-19 @ 07:01  -  02-01-19 @ 06:02  --------------------------------------------------------  NET: -650 mL      Diet: Diet, Regular:   Supplement Feeding Modality:  Oral  Ensure Enlive Cans or Servings Per Day:  1       Frequency:  Two Times a day (01-31-19 @ 12:54)    IV Fluids: yes no , Type:   Physical Examination:  General Appearance: NAD, alert and cooperative  HEENT: NCAT, WNL  Heart: S1 and S2. No murmurs. Rhythm is regular irregular.   Lungs: Clear to auscultation BL without rales, rhonchi, wheezing, crackles or diminished breath sounds.  Abdomen:  Positive bowel sounds. Soft, nondistended, valarie-incisional tenderness nontender. Obese. No rigidity, guarding, or rebound tenderness. No masses palpated. No McBurney point tenderness. No Ta's sign. No Rovsing's sign.   MSK/Extremities: ROM intact BL upper/lower extremities. No joint erythema or tenderness. Normal gait. No significant deformity or joint abnormality. No edema. Peripheral pulses intact. No varicosities. WNL  Neuro: CN II-XII intact. Strength and sensation symmetric and intact throughout. Reflexes 2+ throughout. Cerebellar testing normal. Sensation grossly intact.  Skin: Warm/dry, Normal color, No jaundice.   Incisions/Wounds: Dressings in place, clean, dry and intact, no signs of infection/active bleeding/drainage    Medications: [Standing]  aspirin enteric coated 81 milliGRAM(s) Oral daily  chlorhexidine 4% Liquid 1 Application(s) Topical <User Schedule>  heparin  Injectable 5000 Unit(s) SubCutaneous every 8 hours  metoprolol succinate ER 37.5 milliGRAM(s) Oral daily  pantoprazole    Tablet 40 milliGRAM(s) Oral daily  timolol 0.5% Solution 1 Drop(s) Both EYES daily    DVT Prophylaxis: heparin  Injectable 5000 Unit(s) SubCutaneous every 8 hours    GI Prophylaxis: pantoprazole    Tablet 40 milliGRAM(s) Oral daily    Antibiotics:   Anticoagulation:   Medications:[PRN]  acetaminophen   Tablet .. 650 milliGRAM(s) Oral every 6 hours PRN    Labs:                        8.9    13.61 )-----------( 152      ( 01 Feb 2019 00:34 )             26.7     02-01    139  |  104  |  38<H>  ----------------------------<  124<H>  4.3   |  21  |  1.2    Ca    8.6      01 Feb 2019 00:34  Phos  2.6     02-01  Mg     2.3     02-01                Urine/Micro:    Culture - Urine (collected 29 Jan 2019 21:10)  Source: .Urine Catheterized  Final Report (30 Jan 2019 21:40):    No growth        Imaging:  None/24h    Assessment:  89y Female patient admitted S/P *** , with the above physical exam, labs, and imaging findings.    Plan:      Date/Time: 02-01-19 @ 06:02 Progress Note: General Surgery  Patient: KAMAR OCAMPO , 89y (27-Feb-1915)Female   MRN: [id number]  Location: [address]  Visit: 01-29-19 Inpatient  Date: 02-01-19 @ 06:02  Hospital Day: 4  Post-op Day:     Procedure/Diagnosis: s/p fall, ?HT, ?LOC, -AC, sustained non displaced inferior pubic rami fracture, left anterior acetabular fracture, left pubic body fracture, nondisplaced manubrial fracture, left prox clavicular fracture, left distal clavicular fracture  Events over 24h: Patient desated to SpO2 90s overnight on room air, was placed on 2L NC and repeat O2 measured at 94%.  Patient complains of being weak and tired, states she cannot ambulate because she feels too tired.     Vitals: T(F): 97 (02-01-19 @ 04:03), Max: 100.1 (01-31-19 @ 16:00)  HR: 77 (02-01-19 @ 04:03)  BP: 140/73 (02-01-19 @ 04:03) (123/83 - 156/67)  RR: 18 (02-01-19 @ 00:00)  SpO2: --    In:   01-30-19 @ 07:01  -  01-31-19 @ 07:00  --------------------------------------------------------  IN: 900 mL    01-31-19 @ 07:01  -  02-01-19 @ 06:02  --------------------------------------------------------  IN: 0 mL      Out:   01-30-19 @ 07:01  -  01-31-19 @ 07:00  --------------------------------------------------------  OUT:    Indwelling Catheter - Urethral: 820 mL  Total OUT: 820 mL      01-31-19 @ 07:01  -  02-01-19 @ 06:02  --------------------------------------------------------  OUT:    Indwelling Catheter - Urethral: 650 mL  Total OUT: 650 mL        Net:   01-30-19 @ 07:01  -  01-31-19 @ 07:00  --------------------------------------------------------  NET: 80 mL    01-31-19 @ 07:01  -  02-01-19 @ 06:02  --------------------------------------------------------  NET: -650 mL      Diet: Diet, Regular:   Supplement Feeding Modality:  Oral  Ensure Enlive Cans or Servings Per Day:  1       Frequency:  Two Times a day (01-31-19 @ 12:54)    Physical Examination:  General Appearance: NAD, alert and cooperative  HEENT: NCAT, WNL  Heart: S1 and S2. No murmurs. Rhythm is regular  Lungs: Clear to auscultation BL, ecchymosis present over anterior chest  Abdomen:  Positive bowel sounds. Soft, nondistended, nontender  MSK/Extremities: ROM intact RUE, LUE in sling    Medications: [Standing]  aspirin enteric coated 81 milliGRAM(s) Oral daily  chlorhexidine 4% Liquid 1 Application(s) Topical <User Schedule>  heparin  Injectable 5000 Unit(s) SubCutaneous every 8 hours  metoprolol succinate ER 37.5 milliGRAM(s) Oral daily  pantoprazole    Tablet 40 milliGRAM(s) Oral daily  timolol 0.5% Solution 1 Drop(s) Both EYES daily    DVT Prophylaxis: heparin  Injectable 5000 Unit(s) SubCutaneous every 8 hours    GI Prophylaxis: pantoprazole    Tablet 40 milliGRAM(s) Oral daily    Antibiotics:   Anticoagulation:   Medications:[PRN]  acetaminophen   Tablet .. 650 milliGRAM(s) Oral every 6 hours PRN    Labs:                        8.9    13.61 )-----------( 152      ( 01 Feb 2019 00:34 )             26.7     02-01    139  |  104  |  38<H>  ----------------------------<  124<H>  4.3   |  21  |  1.2    Ca    8.6      01 Feb 2019 00:34  Phos  2.6     02-01  Mg     2.3     02-01                Urine/Micro:    Culture - Urine (collected 29 Jan 2019 21:10)  Source: .Urine Catheterized  Final Report (30 Jan 2019 21:40):    No growth

## 2019-02-01 NOTE — PROGRESS NOTE ADULT - ASSESSMENT
89 year old female with Past Medical History Afib, HTN, severe TR, and moderate MR admitted status post fall.  Her hospital course was complicated by multiple pelvic fractures, fracture of the manubrium, and clavicular fracture.    Fall with multiple fractures: patient does not recall events prior to her fall.  Continue Tylenol PRN.  Check orthostatic blood pressure prior to discharge  Dyspnea secondary to pulmonary hypertension/valvular disease vs. atelectasis: Medicine service was recalled due to concerns of severe pulmonary hypertension.  The patient does not appear to be in volume overload at this time.  She is at high risk of decompensation and re-hospitalization due to age and comorbidities.  Still, I would not advise changes to her medications given stable status at present.  Bladder Wall hematoma: Status post voiding trial.  No evidence of hematuria.  Atrial Fibrillation: continue Lopressor  GI/DVT prophylaxis  Follow-up with physical therapy for disposition

## 2019-02-01 NOTE — PROGRESS NOTE ADULT - ASSESSMENT
Assessment:  89y Female patient admitted S/P *** , with the above physical exam, labs, and imaging findings.    Plan:      Date/Time: 02-01-19 @ 06:02 Assessment:  89y Female patient admitted s/p fall, ?HT, ?LOC, -AC, sustained non displaced inferior pubic rami fracture, left anterior acetabular fracture, left pubic body fracture, nondisplaced manubrial fracture, left prox clavicular fracture, left distal clavicular fracture    Plan:  -medicine reevaluation  -home meds  -pain control  -incentive spirometer  -regular diet  -IVL    Date/Time: 02-01-19 @ 06:02

## 2019-02-01 NOTE — PROGRESS NOTE ADULT - SUBJECTIVE AND OBJECTIVE BOX
KAMAR OCAMPO MRN-[id number]    Hospitalist Note  89 year old female with Past Medical History Afib, HTN, severe TR, and moderate MR admitted status post fall.  Her hospital course was complicated by multiple pelvic fractures, fracture of the manubrium, and clavicular fracture.    Overnight events/Updates: Patient seen and evaluated with daughter at bedside.  The patient was saturating @ 94% on room air. She suffers from a non-productive cough, but no wheezes or rales.  I provided encouragement to use her incentive spirometer.      Vital Signs Last 24 Hrs  T(C): 35.9 (01 Feb 2019 08:29), Max: 37.8 (31 Jan 2019 16:00)  T(F): 96.6 (01 Feb 2019 08:29), Max: 100.1 (31 Jan 2019 16:00)  HR: 97 (01 Feb 2019 08:29) (77 - 97)  BP: 117/66 (01 Feb 2019 08:29) (117/66 - 156/67)  BP(mean): --  RR: 18 (01 Feb 2019 08:29) (18 - 18)  SpO2: --    Physical Examination:  General: AAO x 3  HEENT: PERRLA, EOMI  CV= S1 & S2 appreciated  Lungs= CTA BL  Abdominal Examination= + BS, Soft, NT/ND  Extremity Examination= LUE in sling with ecchymosis    ROS: No chest pain, no shortness of breath.  All other systems reviewed and are within normal limits except for the complaints in the HPI.    MEDICATIONS  (STANDING):  aspirin enteric coated 81 milliGRAM(s) Oral daily  chlorhexidine 4% Liquid 1 Application(s) Topical <User Schedule>  heparin  Injectable 5000 Unit(s) SubCutaneous every 8 hours  metoprolol succinate ER 37.5 milliGRAM(s) Oral daily  pantoprazole    Tablet 40 milliGRAM(s) Oral daily  timolol 0.5% Solution 1 Drop(s) Both EYES daily    MEDICATIONS  (PRN):  acetaminophen   Tablet .. 650 milliGRAM(s) Oral every 6 hours PRN Mild Pain (1 - 3), Moderate Pain (4 - 6)                            8.9    13.61 )-----------( 152      ( 01 Feb 2019 00:34 )             26.7     02-01    139  |  104  |  38<H>  ----------------------------<  124<H>  4.3   |  21  |  1.2    Ca    8.6      01 Feb 2019 00:34  Phos  2.6     02-01  Mg     2.3     02-01        Case discussed with housestaff & family  ISIDRO Phillips [phone]

## 2019-02-02 ENCOUNTER — TRANSCRIPTION ENCOUNTER (OUTPATIENT)
Age: 84
End: 2019-02-02

## 2019-02-02 VITALS
DIASTOLIC BLOOD PRESSURE: 58 MMHG | HEART RATE: 84 BPM | SYSTOLIC BLOOD PRESSURE: 109 MMHG | RESPIRATION RATE: 16 BRPM | TEMPERATURE: 96 F

## 2019-02-02 LAB
ANION GAP SERPL CALC-SCNC: 13 MMOL/L — SIGNIFICANT CHANGE UP (ref 7–14)
BASOPHILS # BLD AUTO: 0.02 K/UL — SIGNIFICANT CHANGE UP (ref 0–0.2)
BASOPHILS NFR BLD AUTO: 0.2 % — SIGNIFICANT CHANGE UP (ref 0–1)
BUN SERPL-MCNC: 40 MG/DL — HIGH (ref 10–20)
CALCIUM SERPL-MCNC: 8.5 MG/DL — SIGNIFICANT CHANGE UP (ref 8.5–10.1)
CHLORIDE SERPL-SCNC: 105 MMOL/L — SIGNIFICANT CHANGE UP (ref 98–110)
CO2 SERPL-SCNC: 23 MMOL/L — SIGNIFICANT CHANGE UP (ref 17–32)
CREAT SERPL-MCNC: 1.1 MG/DL — SIGNIFICANT CHANGE UP (ref 0.7–1.5)
EOSINOPHIL # BLD AUTO: 0.29 K/UL — SIGNIFICANT CHANGE UP (ref 0–0.7)
EOSINOPHIL NFR BLD AUTO: 2.7 % — SIGNIFICANT CHANGE UP (ref 0–8)
GLUCOSE SERPL-MCNC: 116 MG/DL — HIGH (ref 70–99)
HCT VFR BLD CALC: 25.1 % — LOW (ref 37–47)
HGB BLD-MCNC: 8.2 G/DL — LOW (ref 12–16)
IMM GRANULOCYTES NFR BLD AUTO: 0.8 % — HIGH (ref 0.1–0.3)
LYMPHOCYTES # BLD AUTO: 1.05 K/UL — LOW (ref 1.2–3.4)
LYMPHOCYTES # BLD AUTO: 9.8 % — LOW (ref 20.5–51.1)
MAGNESIUM SERPL-MCNC: 2.2 MG/DL — SIGNIFICANT CHANGE UP (ref 1.8–2.4)
MCHC RBC-ENTMCNC: 32.7 G/DL — SIGNIFICANT CHANGE UP (ref 32–37)
MCHC RBC-ENTMCNC: 32.8 PG — HIGH (ref 27–31)
MCV RBC AUTO: 100.4 FL — HIGH (ref 81–99)
MONOCYTES # BLD AUTO: 1.13 K/UL — HIGH (ref 0.1–0.6)
MONOCYTES NFR BLD AUTO: 10.5 % — HIGH (ref 1.7–9.3)
NEUTROPHILS # BLD AUTO: 8.18 K/UL — HIGH (ref 1.4–6.5)
NEUTROPHILS NFR BLD AUTO: 76 % — HIGH (ref 42.2–75.2)
NRBC # BLD: 0 /100 WBCS — SIGNIFICANT CHANGE UP (ref 0–0)
PHOSPHATE SERPL-MCNC: 2.3 MG/DL — SIGNIFICANT CHANGE UP (ref 2.1–4.9)
PLATELET # BLD AUTO: 155 K/UL — SIGNIFICANT CHANGE UP (ref 130–400)
POTASSIUM SERPL-MCNC: 4.3 MMOL/L — SIGNIFICANT CHANGE UP (ref 3.5–5)
POTASSIUM SERPL-SCNC: 4.3 MMOL/L — SIGNIFICANT CHANGE UP (ref 3.5–5)
RBC # BLD: 2.5 M/UL — LOW (ref 4.2–5.4)
RBC # FLD: 13.6 % — SIGNIFICANT CHANGE UP (ref 11.5–14.5)
SODIUM SERPL-SCNC: 141 MMOL/L — SIGNIFICANT CHANGE UP (ref 135–146)
WBC # BLD: 10.76 K/UL — SIGNIFICANT CHANGE UP (ref 4.8–10.8)
WBC # FLD AUTO: 10.76 K/UL — SIGNIFICANT CHANGE UP (ref 4.8–10.8)

## 2019-02-02 PROCEDURE — 99233 SBSQ HOSP IP/OBS HIGH 50: CPT

## 2019-02-02 RX ORDER — ACETAMINOPHEN 500 MG
2 TABLET ORAL
Qty: 0 | Refills: 0 | COMMUNITY
Start: 2019-02-02

## 2019-02-02 RX ORDER — ASPIRIN/CALCIUM CARB/MAGNESIUM 324 MG
1 TABLET ORAL
Qty: 0 | Refills: 0 | COMMUNITY
Start: 2019-02-02

## 2019-02-02 RX ADMIN — Medication 81 MILLIGRAM(S): at 11:12

## 2019-02-02 RX ADMIN — PANTOPRAZOLE SODIUM 40 MILLIGRAM(S): 20 TABLET, DELAYED RELEASE ORAL at 11:12

## 2019-02-02 RX ADMIN — CHLORHEXIDINE GLUCONATE 1 APPLICATION(S): 213 SOLUTION TOPICAL at 06:29

## 2019-02-02 RX ADMIN — Medication 62.5 MILLIMOLE(S): at 11:12

## 2019-02-02 RX ADMIN — HEPARIN SODIUM 5000 UNIT(S): 5000 INJECTION INTRAVENOUS; SUBCUTANEOUS at 06:28

## 2019-02-02 RX ADMIN — Medication 37.5 MILLIGRAM(S): at 06:29

## 2019-02-02 RX ADMIN — HEPARIN SODIUM 5000 UNIT(S): 5000 INJECTION INTRAVENOUS; SUBCUTANEOUS at 13:22

## 2019-02-02 RX ADMIN — Medication 1 DROP(S): at 11:12

## 2019-02-02 NOTE — PROGRESS NOTE ADULT - SUBJECTIVE AND OBJECTIVE BOX
KAMAR OCAMPO  89y  [facility] [id number]      Patient is a 89y old  Female who presents with a chief complaint of s/p unwitnessed fall, ?HT, ?LOC, -AC (02 Feb 2019 03:31)    called by surgery to evaluated patient prior to dc and clear for dc  INTERVAL HPI/OVERNIGHT EVENTS:    no acute overnight events    ROS negative    T(C): 36.1 (02-02-19 @ 08:06), Max: 36.4 (02-01-19 @ 15:44)  HR: 92 (02-02-19 @ 08:06) (73 - 100)  BP: 116/61 (02-02-19 @ 08:06) (116/61 - 136/69)  RR: 18 (02-02-19 @ 08:06) (18 - 18)  SpO2: --  Wt(kg): --Vital Signs Last 24 Hrs  T(C): 36.1 (02 Feb 2019 08:06), Max: 36.4 (01 Feb 2019 15:44)  T(F): 97 (02 Feb 2019 08:06), Max: 97.5 (01 Feb 2019 15:44)  HR: 92 (02 Feb 2019 08:06) (73 - 100)  BP: 116/61 (02 Feb 2019 08:06) (116/61 - 136/69)  BP(mean): --  RR: 18 (02 Feb 2019 08:06) (18 - 18)  SpO2: --    PHYSICAL EXAM:  GENERAL: NAD, well-groomed, well-developed  HEAD:  Atraumatic, Normocephalic  EYES: EOMI, PERRLA, conjunctiva and sclera clear  ENMT: No tonsillar erythema, exudates, or enlargement; Moist mucous membranes, Good dentition, No lesions  NECK: Supple, No JVD, Normal thyroid  NERVOUS SYSTEM:  Alert & Oriented X3,  PULM: Clear to auscultation bilaterally  CARDIAC: S1 S2   GI: Soft, Nontender, Nondistended; Bowel sounds present  EXTREMITIES:  left upper extremity in sling  LYMPH: No lymphadenopathy noted  SKIN: No rashes or lesions      LABS:                            8.2    10.76 )-----------( 155      ( 02 Feb 2019 01:33 )             25.1   02-02    141  |  105  |  40<H>  ----------------------------<  116<H>  4.3   |  23  |  1.1    Ca    8.5      02 Feb 2019 01:33  Phos  2.3     02-02  Mg     2.2     02-02      acetaminophen   Tablet .. 650 milliGRAM(s) Oral every 6 hours PRN  aspirin enteric coated 81 milliGRAM(s) Oral daily  chlorhexidine 4% Liquid 1 Application(s) Topical <User Schedule>  heparin  Injectable 5000 Unit(s) SubCutaneous every 8 hours  metoprolol succinate ER 37.5 milliGRAM(s) Oral daily  pantoprazole    Tablet 40 milliGRAM(s) Oral daily  sodium phosphate IVPB 15 milliMole(s) IV Intermittent once  timolol 0.5% Solution 1 Drop(s) Both EYES daily        Case Discussed with House Staff   Spectra [phone]

## 2019-02-02 NOTE — PROGRESS NOTE ADULT - ASSESSMENT
#fall with multiple fracture appreciate trauma care    #Dyspnea secondary Severe pulmonary hypertension , atelectasis , tricuspid regurgitation   stable at this time ,   discussed with PMD Dr Jordan , patient , and niece -> given patient's number of chronic severe conditions which are at the risk of rehospitlization but are currently stable will not alter medications, PMD will follow up outpatient     #Atrial fibrillation - no candidate for AC due to falls, rate controlled     #Pancreatic head lesion op follow up     #uterine fibrioids stable     #Severe osteopenia with suspicion for vitamin d def - check vitamin d level     DW Housestaff, patient, PMD, and niece  Patient and niece agreeable to dc   Patient is stable for dc

## 2019-02-02 NOTE — PROGRESS NOTE ADULT - ATTENDING COMMENTS
I discussed this case with the physician's assistant and agree with the above
pt denies pain and transferred from the bed to chair with standing pivot today.  she denies SOB.  however, medicine still to see for severe pulmonary htn and will determine whether pt needs continued admission or can be discharged to rehab facility
s/p fall with multiple injuries   c/o SOB   IVL   bronchodilators   obtain CXR looking for lung contusions   will continue to monitor,
feeling the same c/o fatigue   pulmonary hypertension , severe   medicine consult   continue pain meds   continue present care
pt is advanced age with high trauma burden; nonetheless she denies pain and experienced no cardiorespiratory deterioration over night  she can be downgraded to the floor today

## 2019-02-02 NOTE — DISCHARGE NOTE ADULT - CARE PROVIDER_API CALL
Miguel Mcdaniels)  Orthopaedic Surgery  [address] [place] [address]  Phone: [phone]  Fax: [phone]

## 2019-02-02 NOTE — DISCHARGE NOTE ADULT - ADDITIONAL INSTRUCTIONS
Follow up with Orthopedics (Dr. Mcdaniels or Dr. Nguyễn). [address] [facility]. Please call  [phone] to schedule an appointment.   Please follow up with your Primary care Doctor for further work up with Pulmonary Hypertension.

## 2019-02-02 NOTE — DISCHARGE NOTE ADULT - HOSPITAL COURSE
89 year old female with PMH significant for Afib, HTN, tricuspid regurg, and mitral regurg presents to the ED s/p unwitnessed fall, ?HT, ?LOC, on aspirin, found to have left superior and inferior pubic rami fractures, manubrium fracture, left clavicular fracture, and hematoma along anterior margin of urinary bladder. Pt was admitted to the surgical team, admitted into SICU. As per orthopedics: WBAT LLE, NWB LUE in sling, no acute orthopedic intervention at this time. As per urology, indwelling thornton in place, no hematuria. During hospital stay Patient desated to SpO2 90s overnight on room air, was placed on 2L NC and repeat O2 measured at 94%.  Patient complains of being weak and tired, states she cannot ambulate because she feels too tired. Echo showed severe pulmonary hypertension. Medicine evaluation: no change in patients current medications, follow up with PCP.       f/u as outpatient with orthopedics at [address] [place] [address] with Dr. Mcdaniels or Dr. Nguyễn [phone].

## 2019-02-02 NOTE — DISCHARGE NOTE ADULT - PATIENT PORTAL LINK FT
You can access the [facility] Patient Portal, offered by [facility], by registering with the following website: [email or web address]

## 2019-02-02 NOTE — PROGRESS NOTE ADULT - REASON FOR ADMISSION
s/p unwitnessed fall, ?HT, ?LOC, -AC

## 2019-02-02 NOTE — PROGRESS NOTE ADULT - SUBJECTIVE AND OBJECTIVE BOX
GENERAL SURGERY PROGRESS NOTE     TERRENCE KAMAR  [address] [place] [address] day :4d  POD:  Procedure:   Surgical Attending: Jeff Gave  Overnight events:    T(F): 95.7 (02-02-19 @ 00:00), Max: 97.5 (02-01-19 @ 15:44)  HR: 73 (02-02-19 @ 00:00) (73 - 100)  BP: 136/69 (02-02-19 @ 00:00) (117/66 - 140/73)  ABP: --  ABP(mean): --  RR: 18 (02-01-19 @ 15:44) (18 - 18)  SpO2: --      01-31-19 @ 07:01  -  02-01-19 @ 07:00  --------------------------------------------------------  IN:  Total IN: 0 mL    OUT:    Indwelling Catheter - Urethral: 650 mL  Total OUT: 650 mL    Total NET: -650 mL      02-01-19 @ 07:01  -  02-02-19 @ 03:52  --------------------------------------------------------  IN:  Total IN: 0 mL    OUT:    Voided: 375 mL  Total OUT: 375 mL    Total NET: -375 mL        DIET/FLUIDS:   NG:                                                                                DRAINS:     BM:     EMESIS:     URINE:   01-31-19 @ 07:01  -  02-01-19 @ 07:00  --------------------------------------------------------  OUT: 650 mL       GI proph:  pantoprazole    Tablet 40 milliGRAM(s) Oral daily    AC/ proph: aspirin enteric coated 81 milliGRAM(s) Oral daily  heparin  Injectable 5000 Unit(s) SubCutaneous every 8 hours    ABx:     PHYSICAL EXAM:  GENERAL: NAD, well-appearing  CHEST/LUNG: Clear to auscultation bilaterally  HEART: Regular rate and rhythm  ABDOMEN: Soft, Nontender, Nondistended;   EXTREMITIES:  No clubbing, cyanosis, or edema      LABS  Labs:  CAPILLARY BLOOD GLUCOSE                              8.2    10.76 )-----------( 155      ( 02 Feb 2019 01:33 )             25.1       Auto Immature Granulocyte %: 0.8 % (02-02-19 @ 01:33)  Auto Neutrophil %: 76.0 % (02-02-19 @ 01:33)    02-02    141  |  105  |  40<H>  ----------------------------<  116<H>  4.3   |  23  |  1.1      Calcium, Total Serum: 8.5 mg/dL (02-02-19 @ 01:33)      LFTs:     Blood Gas Arterial, Lactate: 0.9 mmoL/L (01-30-19 @ 05:14)  Blood Gas Arterial, Lactate: 0.9 mmoL/L (01-30-19 @ 04:33)    ABG - ( 30 Jan 2019 05:14 )  pH: 7.49  /  pCO2: 31    /  pO2: 58    / HCO3: 24    / Base Excess: 1.0   /  SaO2: 93              ABG - ( 30 Jan 2019 04:33 )  pH: 7.49  /  pCO2: 31    /  pO2: 58    / HCO3: 24    / Base Excess: 1.0   /  SaO2: 93              ABG - ( 29 Jan 2019 22:25 )  pH: 7.47  /  pCO2: 33    /  pO2: 44    / HCO3: 24    / Base Excess: 0.3   /  SaO2: 82                Coags:                    RADIOLOGY & ADDITIONAL TESTS:      A/P:  KAMAR OCAMPO is a 89yFemale HD/POD___ from . She is currently    Plan: GENERAL SURGERY PROGRESS NOTE     TERRENCE KAMAR  [address] [place] [address] day :4d  Surgical Attending: Jeff Gave  Overnight events: No acute events overnight. Patient continues to require 2L NC to sat at 96%. Awaiting additional eval by hospitalist team d/t concern regarding suitability for discharge to SNF.    T(F): 95.7 (02-02-19 @ 00:00), Max: 97.5 (02-01-19 @ 15:44)  HR: 73 (02-02-19 @ 00:00) (73 - 100)  BP: 136/69 (02-02-19 @ 00:00) (117/66 - 140/73)  ABP: --  ABP(mean): --  RR: 18 (02-01-19 @ 15:44) (18 - 18)  SpO2: --      01-31-19 @ 07:01  -  02-01-19 @ 07:00  --------------------------------------------------------  IN:  Total IN: 0 mL    OUT:    Indwelling Catheter - Urethral: 650 mL  Total OUT: 650 mL    Total NET: -650 mL      02-01-19 @ 07:01  -  02-02-19 @ 03:52  --------------------------------------------------------  IN:  Total IN: 0 mL    OUT:    Voided: 375 mL  Total OUT: 375 mL    Total NET: -375 mL    URINE:   01-31-19 @ 07:01  -  02-01-19 @ 07:00  --------------------------------------------------------  OUT: 650 mL       GI proph:  pantoprazole    Tablet 40 milliGRAM(s) Oral daily    AC/ proph: aspirin enteric coated 81 milliGRAM(s) Oral daily  heparin  Injectable 5000 Unit(s) SubCutaneous every 8 hours    ABx:     PHYSICAL EXAM:  GENERAL: NAD, resting in bed, NC in place  CHEST/LUNG: Clear to auscultation bilaterally  HEART: Regular rate and rhythm  ABDOMEN: Soft, nontender, nondistended;         LABS  Labs:  CAPILLARY BLOOD GLUCOSE                              8.2    10.76 )-----------( 155      ( 02 Feb 2019 01:33 )             25.1       Auto Immature Granulocyte %: 0.8 % (02-02-19 @ 01:33)  Auto Neutrophil %: 76.0 % (02-02-19 @ 01:33)    02-02    141  |  105  |  40<H>  ----------------------------<  116<H>  4.3   |  23  |  1.1      Calcium, Total Serum: 8.5 mg/dL (02-02-19 @ 01:33)      LFTs:     Blood Gas Arterial, Lactate: 0.9 mmoL/L (01-30-19 @ 05:14)  Blood Gas Arterial, Lactate: 0.9 mmoL/L (01-30-19 @ 04:33)    ABG - ( 30 Jan 2019 05:14 )  pH: 7.49  /  pCO2: 31    /  pO2: 58    / HCO3: 24    / Base Excess: 1.0   /  SaO2: 93              ABG - ( 30 Jan 2019 04:33 )  pH: 7.49  /  pCO2: 31    /  pO2: 58    / HCO3: 24    / Base Excess: 1.0   /  SaO2: 93              ABG - ( 29 Jan 2019 22:25 )  pH: 7.47  /  pCO2: 33    /  pO2: 44    / HCO3: 24    / Base Excess: 0.3   /  SaO2: 82

## 2019-02-02 NOTE — DISCHARGE NOTE ADULT - CARE PLAN
Principal Discharge DX:	Fall  Goal:	Recovery  Assessment and plan of treatment:	Follow up with Orthopedics (Dr. Mcdaniels or Dr. Nguyễn). [address] [facility]. Please call  [phone] to schedule an appointment.   Please follow up with your Primary care Doctor for further work up with Pulmonary Hypertension.

## 2019-02-02 NOTE — DISCHARGE NOTE ADULT - PLAN OF CARE
Recovery Follow up with Orthopedics (Dr. Mcdaniels or Dr. Nguyễn). [address] [facility]. Please call  [phone] to schedule an appointment.   Please follow up with your Primary care Doctor for further work up with Pulmonary Hypertension.

## 2019-02-02 NOTE — PROGRESS NOTE ADULT - ASSESSMENT
A/P:  KAMAR OCAMPO is a 89yFemale HD4 s/p fall, ?HT, ?LOC, -AC, sustained non displaced inferior pubic rami fracture, left anterior acetabular fracture, left pubic body fracture, nondisplaced manubrial fracture, left prox clavicular fracture, left distal clavicular fracture    Plan:  -follow-up medicine for re-evaluation in setting of newly diagnosed severe pulmonary hypertension on echo with patient endorsed weakness with breathing and concern for appropriate discharge  -trend vitals  -strict I/Os  -DVT/GI ppx  -regular diet

## 2019-02-02 NOTE — DISCHARGE NOTE ADULT - MEDICATION SUMMARY - MEDICATIONS TO TAKE
I will START or STAY ON the medications listed below when I get home from the hospital:    acetaminophen 325 mg oral tablet  -- 2 tab(s) by mouth every 6 hours, As needed, Mild Pain (1 - 3), Moderate Pain (4 - 6)  -- Indication: For Pain control    aspirin 81 mg oral delayed release tablet  -- 1 tab(s) by mouth once a day  -- Indication: For HTN (hypertension)    Metoprolol Succinate ER 50 mg oral tablet, extended release  -- 1 tab(s) by mouth once a day  -- Indication: For HTN (hypertension)    timolol hemihydrate 0.5% ophthalmic solution  -- 1 dose(s) to each affected eye once a day  -- Indication: For Opthlamic solution

## 2019-02-04 ENCOUNTER — OUTPATIENT (OUTPATIENT)
Dept: OUTPATIENT SERVICES | Facility: HOSPITAL | Age: 84
LOS: 1 days | Discharge: HOME | End: 2019-02-04

## 2019-02-04 DIAGNOSIS — D64.9 ANEMIA, UNSPECIFIED: ICD-10-CM

## 2019-02-04 DIAGNOSIS — S43.006A UNSPECIFIED DISLOCATION OF UNSPECIFIED SHOULDER JOINT, INITIAL ENCOUNTER: Chronic | ICD-10-CM

## 2019-02-04 DIAGNOSIS — S72.009A FRACTURE OF UNSPECIFIED PART OF NECK OF UNSPECIFIED FEMUR, INITIAL ENCOUNTER FOR CLOSED FRACTURE: Chronic | ICD-10-CM

## 2019-02-04 PROBLEM — I48.0 PAROXYSMAL ATRIAL FIBRILLATION: Chronic | Status: ACTIVE | Noted: 2019-01-29

## 2019-02-04 PROBLEM — I10 ESSENTIAL (PRIMARY) HYPERTENSION: Chronic | Status: ACTIVE | Noted: 2019-01-29

## 2019-02-04 PROBLEM — I07.1 RHEUMATIC TRICUSPID INSUFFICIENCY: Chronic | Status: ACTIVE | Noted: 2019-01-29

## 2019-02-04 PROBLEM — I34.0 NONRHEUMATIC MITRAL (VALVE) INSUFFICIENCY: Chronic | Status: ACTIVE | Noted: 2019-01-29

## 2019-02-06 DIAGNOSIS — I45.10 UNSPECIFIED RIGHT BUNDLE-BRANCH BLOCK: ICD-10-CM

## 2019-02-06 DIAGNOSIS — I10 ESSENTIAL (PRIMARY) HYPERTENSION: ICD-10-CM

## 2019-02-06 DIAGNOSIS — I48.91 UNSPECIFIED ATRIAL FIBRILLATION: ICD-10-CM
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
## 2019-02-06 DIAGNOSIS — S32.512A FRACTURE OF SUPERIOR RIM OF LEFT PUBIS, INITIAL ENCOUNTER FOR CLOSED FRACTURE: ICD-10-CM

## 2019-02-06 DIAGNOSIS — M85.80 OTHER SPECIFIED DISORDERS OF BONE DENSITY AND STRUCTURE, UNSPECIFIED SITE: ICD-10-CM

## 2019-02-06 DIAGNOSIS — N17.9 ACUTE KIDNEY FAILURE, UNSPECIFIED: ICD-10-CM

## 2019-02-06 DIAGNOSIS — I27.20 PULMONARY HYPERTENSION, UNSPECIFIED: ICD-10-CM

## 2019-02-06 DIAGNOSIS — R06.00 DYSPNEA, UNSPECIFIED: ICD-10-CM

## 2019-02-06 DIAGNOSIS — Z96.641 PRESENCE OF RIGHT ARTIFICIAL HIP JOINT: ICD-10-CM

## 2019-02-06 DIAGNOSIS — K86.9 DISEASE OF PANCREAS, UNSPECIFIED: ICD-10-CM
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
## 2019-02-06 DIAGNOSIS — I08.1 RHEUMATIC DISORDERS OF BOTH MITRAL AND TRICUSPID VALVES: ICD-10-CM

## 2019-02-06 DIAGNOSIS — S37.22XA CONTUSION OF BLADDER, INITIAL ENCOUNTER: ICD-10-CM

## 2019-02-06 DIAGNOSIS — E55.9 VITAMIN D DEFICIENCY, UNSPECIFIED: ICD-10-CM

## 2019-02-06 DIAGNOSIS — J98.11 ATELECTASIS: ICD-10-CM

## 2019-02-06 DIAGNOSIS — D72.829 ELEVATED WHITE BLOOD CELL COUNT, UNSPECIFIED: ICD-10-CM

## 2019-02-06 DIAGNOSIS — S22.21XA FRACTURE OF MANUBRIUM, INITIAL ENCOUNTER FOR CLOSED FRACTURE: ICD-10-CM

## 2019-02-06 DIAGNOSIS — D25.9 LEIOMYOMA OF UTERUS, UNSPECIFIED: ICD-10-CM

## 2019-02-06 DIAGNOSIS — S42.002A FRACTURE OF UNSPECIFIED PART OF LEFT CLAVICLE, INITIAL ENCOUNTER FOR CLOSED FRACTURE: ICD-10-CM

## 2019-02-11 ENCOUNTER — EMERGENCY (EMERGENCY)
Facility: HOSPITAL | Age: 84
LOS: 0 days | End: 2019-02-11
Attending: EMERGENCY MEDICINE | Admitting: EMERGENCY MEDICINE

## 2019-02-11 DIAGNOSIS — S43.006A UNSPECIFIED DISLOCATION OF UNSPECIFIED SHOULDER JOINT, INITIAL ENCOUNTER: Chronic | ICD-10-CM

## 2019-02-11 DIAGNOSIS — I10 ESSENTIAL (PRIMARY) HYPERTENSION: ICD-10-CM

## 2019-02-11 DIAGNOSIS — I46.9 CARDIAC ARREST, CAUSE UNSPECIFIED: ICD-10-CM

## 2019-02-11 DIAGNOSIS — Z96.642 PRESENCE OF LEFT ARTIFICIAL HIP JOINT: ICD-10-CM

## 2019-02-11 DIAGNOSIS — Z79.899 OTHER LONG TERM (CURRENT) DRUG THERAPY: ICD-10-CM

## 2019-02-11 DIAGNOSIS — Z98.890 OTHER SPECIFIED POSTPROCEDURAL STATES: ICD-10-CM

## 2019-02-11 DIAGNOSIS — Z79.82 LONG TERM (CURRENT) USE OF ASPIRIN: ICD-10-CM

## 2019-02-11 DIAGNOSIS — S72.009A FRACTURE OF UNSPECIFIED PART OF NECK OF UNSPECIFIED FEMUR, INITIAL ENCOUNTER FOR CLOSED FRACTURE: Chronic | ICD-10-CM

## 2019-02-11 DIAGNOSIS — Z79.891 LONG TERM (CURRENT) USE OF OPIATE ANALGESIC: ICD-10-CM

## 2019-02-11 NOTE — ED PROVIDER NOTE - PROGRESS NOTE DETAILS
time of death called at 2:54, no cardiac wall motion, no spontaneous breathing, no mvoeemtn, pupils fixed and 4mm.

## 2019-02-11 NOTE — ED ADULT NURSE NOTE - OBJECTIVE STATEMENT
pt BIBA from NH post cardiac arrest. Upon arrival to ED, pt intubated by EMS. Family at bedside (daughter, health care proxy) wishes for no further interventions at this time.

## 2019-02-11 NOTE — ED PROVIDER NOTE - CRITICAL CARE PROVIDED
consult w/ pt's family directly relating to pts condition/direct patient care (not related to procedure)/documentation/conducted a detailed discussion of DNR status/additional history taking

## 2019-02-11 NOTE — ED PROVIDER NOTE - OBJECTIVE STATEMENT
89 yo F pmh of a fib presents post cardiac arrest. Patient had a fall 2 week ago was in [facility] for rehabiliation when she developed respiratory distress that worsened throughout the morning. Patient had a cardiac arrest, EMS arrived gave 3 EPIs, no shocks give. Patient was in asystole, intubated. On arrival family arrived and states that patient was DNR/DNI. Patient lost pulses again and CPR was not continued. Time of death was called at 2:54pm.

## 2019-02-11 NOTE — ED PROVIDER NOTE - ATTENDING CONTRIBUTION TO CARE
htn, afib, hx of valvulvar abn pw in cardiac arrest, found asystole by ems, 3 rounds of epi given, approxiamte time was 25-40 min. rosc was not obtian, noemy requests that patient is DNR and confirmed on paper work. CPR was not continued here. pupils were fixed 3-4 mm, no chest rise, cardiac stand still on echo, no pulses, no movement, time of death called at 2:54. discussed with famil who is present.

## 2019-02-11 NOTE — ED PROVIDER NOTE - PHYSICAL EXAMINATION
CONSTITUTIONAL: Well-developed; well-nourished; in no acute distress.   SKIN: warm, dry, old healing ecchymosis to the left arm and chest.   HEAD: Normocephalic; atraumatic.  EYES: pupils fixed, non reactive, no conjunctival erythema  ENT: No nasal discharge; airway clear.  NECK: Supple;   CARD: S1, S2 normal;  Regular rate and rhythm.   RESP: No wheezes, rales or rhonchi. patient intubated   ABD: soft non tender, non distended  EXT: Normal ROM.    LYMPH: No acute cervical adenopathy.  NEURO: unresponsive

## 2019-04-09 ENCOUNTER — APPOINTMENT (OUTPATIENT)
Dept: CARDIOLOGY | Facility: CLINIC | Age: 84
End: 2019-04-09

## 2021-08-11 NOTE — CONSULT NOTE ADULT - SUBJECTIVE AND OBJECTIVE BOX
.Mrs. Churchill is a very pleasant [age]-year-old female who had a fall earlier today and was admitted with pelvic fractures, question loss of consciousness, and was not able to void. Previous attempts by the staff to pass a fully met with difficulty and they weren’t sure if the latter was still connected and the question of whether or not the cystogram needs to be done. I therefore came to the ICU to see the patient.  I saw her last night and the physician’s assistant was going to write the consult note for me to review today. She had other more urgent issues and I did not realize until now that the note was not written so I’m writing this note for what we did yesterday. Please note the trauma team had one of the residents there while I saw the patient and the issues were discussed with him so they were aware of what we had done what I wanted done or what was going on From a urologic viewpoint    VITAL SIGNS:  T(C): 35.7 (01-30-19 @ 17:00), Max: 36.8 (01-30-19 @ 08:00)  T(F): 96.3 (01-30-19 @ 17:00), Max: 98.2 (01-30-19 @ 08:00)  HR: 73 (01-30-19 @ 17:00) (68 - 100)  BP: 109/67 (01-30-19 @ 17:00) (81/48 - 114/50)  BP(mean): 55 (01-30-19 @ 15:00) (45 - 85)  RR: 18 (01-30-19 @ 17:00) (16 - 35)  SpO2: 93% (01-30-19 @ 15:00) (90% - 97%)      01-29-19 @ 07:01  -  01-30-19 @ 07:00  --------------------------------------------------------  IN: 3450 mL / OUT: 770 mL / NET: 2680 mL    01-30-19 @ 07:01  -  01-30-19 @ 21:37  --------------------------------------------------------  IN: 650 mL / OUT: 170 mL / NET: 480 mL        Creatinine, Serum: 1.3 mg/dL (01-30-19 @ 05:30)  Creatinine, Serum: 1.5 mg/dL (01-29-19 @ 12:41)      Hemoglobin: 9.4 g/dL (01-30-19 @ 05:30)  Hemoglobin: 9.6 g/dL (01-30-19 @ 01:03)  Hemoglobin: 11.3 g/dL (01-29-19 @ 12:41)      WBC Count: 13.12 K/uL (01-30-19 @ 05:30)  WBC Count: 14.27 K/uL (01-30-19 @ 01:03)  WBC Count: 22.09 K/uL (01-29-19 @ 12:41)      INR: 1.19 ratio (01-29-19 @ 12:41)    Urinalysis Basic - ( 30 Jan 2019 01:12 )    Color: Yellow / Appearance: Clear / SG: >=1.030 / pH: x  Gluc: x / Ketone: Negative  / Bili: Negative / Urobili: 1.0   Blood: x / Protein: Trace / Nitrite: Negative   Leuk Esterase: Negative / RBC: 6-10 /HPF / WBC x   Sq Epi: x / Non Sq Epi: x / Bacteria: x          01-30    143  |  105  |  38<H>  ----------------------------<  103<H>  4.5   |  22  |  1.3    Ca    8.6      30 Jan 2019 05:30  Phos  2.7     01-30  Mg     1.8     01-30    TPro  7.2  /  Alb  4.2  /  TBili  0.9  /  DBili  x   /  AST  40  /  ALT  17  /  AlkPhos  62  01-29      acetaminophen   Tablet .. 650 milliGRAM(s) Oral every 6 hours PRN  aspirin enteric coated 81 milliGRAM(s) Oral daily  chlorhexidine 4% Liquid 1 Application(s) Topical <User Schedule>  heparin  Injectable 5000 Unit(s) SubCutaneous every 8 hours  metoprolol succinate ER 37.5 milliGRAM(s) Oral daily  pantoprazole    Tablet 40 milliGRAM(s) Oral daily  sodium chloride 0.9%. 1000 milliLiter(s) IV Continuous <Continuous>  timolol 0.5% Solution 1 Drop(s) Both EYES daily      PHYSICAL EXAM:    Constitutional: WDWN resting in bed; NAD Though when you move her legs he is an obvious discomfort  Respiratory: CTA B/L; no W/R/R, no retractions  Gastrointestinal: soft, NT/ND; There is a palpable bladder  : Her introitus is narrowed and barely fits finger. There was no blood at the urethral meatus  She was able to move her right leg Movement of her left leggave her pain  Neurologic: AAOx3, she understood what we were doing, and was able to cooperateto the best of her ability    < from: CT Abdomen and Pelvis w/ IV Cont (01.29.19 @ 13:34) >  EXAM:  CT ABDOMEN AND PELVIS IC        EXAM:  CT CHEST IC            PROCEDURE DATE:  01/29/2019            INTERPRETATION:  CLINICAL STATEMENT: Unwitnessed fall.      TECHNIQUE: Contiguous axial CT images were obtained from the thoracic   inlet tothe pubic symphysis following administration of 100c Optiray 320   intravenous contrast.  Oral contrast was not administered.  Reformatted   images in the coronal and sagittal planes were acquired.    COMPARISON CT: None.     OTHER STUDIES USED FOR CORRELATION: None.       FINDINGS:  Examination is limited by motion artifact.    CHEST:    LUNGS/PLEURA/AIRWAYS: Scattered areas of tubular, branching opacities in   the right upper lobe and to a lesser degree in the left upper lobe most   likely represents areas of mucoid impaction. No lobar consolidations,   pleural effusions or pneumothorax. No suspicious pulmonary masses.   Bibasilar dependent atelectasis. Patent central airways.    MEDIASTINUM/THORACIC NODES: Subcentimeter mediastinal lymph nodes,   nonspecific. Small hypodense right thyroid lobe nodules.    HEART/GREAT VESSELS: Dilated right atrium. Mitral annular calcifications.   Atherosclerotic vascular calcifications including coronary arteries.      ABDOMEN/PELVIS:    HEPATOBILIARY:Unremarkable.    SPLEEN: Unremarkable.    PANCREAS: Subcentimeter incompletely characterized hypodense lesion in   the pancreatic head (series 2, image 67). No main pancreatic ductal   dilation.    ADRENAL GLANDS: Unremarkable.    KIDNEYS: Symmetric enhancement bilaterally. No hydronephrosis.   Subcentimeter hypodense lesions in both kidneys, too small to   characterize.    ABDOMINOPELVIC NODES: No lymphadenopathy    PELVIC ORGANS: Multiple calcified uterine fibroids.    PERITONEUM/MESENTERY/BOWEL: Colonic diverticulosis. No bowel obstruction,   ascites or pneumoperitoneum. Small hiatal hernia.    BONES/SOFT TISSUES: Severe osteopenia. Acute nondisplaced fractures of   the left inferior pubic ramus and left high superior pubic ramus/anterior   acetabular wall. Ill-defined soft tissue hematoma seen in the anterior   pelvis along the anterior margin of the urinary bladder. Nondisplaced   left pubic body fracture. Partially imaged acute left distal clavicular   fracture and comminuted proximal left clavicular fracture with adjacent   soft tissue swelling/hematoma. Nondisplaced manubrial fracture.     Multilevel degenerative change of the thoracal lumbar spine noted. Post   left hip gamma nail fixation. Partially imaged left humerus orthopedic   hardware. Severe degenerative changes of the right glenohumeral joint   noted.    OTHER: Atherosclerotic vascular calcifications. Ectatic abdominal aorta.    IMPRESSION:   1.  Acute nondisplaced left inferior pubic ramus and left high superior  pubic ramus/anterior acetabular wall and left pubic body fractures;   ill-defined pelvic soft tissue hematoma along the anterior margin of the   urinary bladder.    2.  Nondisplaced manubrial fracture, comminuted left proximal clavicular   fractureand partially imaged distal clavicular fracture.    3.  No evidence of solid organ or vascular injury in the chest, abdomen   or pelvis.     4.  Additional incidental findings as above.                  FAHAD BAEZA M.D., ATTENDING RADIOLOGIST  This document has been electronically signed. Jan 29 2019  2:22PM        < end of copied text >  I reviewed the films personally and felt that the plane around the bladder appeared to be intact and that the hematoma may be more periosteal then valarie vesical Hyperglycemia

## 2022-10-19 NOTE — PHYSICAL THERAPY INITIAL EVALUATION ADULT - DIAGNOSIS, PT EVAL
Gait Dysfunction Rotation Flap Text: The defect edges were debeveled with a #15 scalpel blade.  Given the location of the defect, shape of the defect and the proximity to free margins a rotation flap was deemed most appropriate.  Using a sterile surgical marker, an appropriate rotation flap was drawn incorporating the defect and placing the expected incisions within the relaxed skin tension lines where possible.    The area thus outlined was incised deep to adipose tissue with a #15 scalpel blade.  The skin margins were undermined to an appropriate distance in all directions utilizing iris scissors.

## 2024-01-06 NOTE — ED ADULT NURSE NOTE - CAS DISCH ACCOMP BY
Patient requests all Lab, Cardiology, and Radiology Results on their Discharge Instructions
RN/Transport
